# Patient Record
Sex: FEMALE | Race: WHITE | NOT HISPANIC OR LATINO | Employment: PART TIME | URBAN - METROPOLITAN AREA
[De-identification: names, ages, dates, MRNs, and addresses within clinical notes are randomized per-mention and may not be internally consistent; named-entity substitution may affect disease eponyms.]

---

## 2017-08-30 ENCOUNTER — HOSPITAL ENCOUNTER (EMERGENCY)
Facility: HOSPITAL | Age: 57
Discharge: HOME | End: 2017-08-30
Attending: EMERGENCY MEDICINE | Admitting: EMERGENCY MEDICINE

## 2017-08-30 ENCOUNTER — APPOINTMENT (OUTPATIENT)
Dept: GENERAL RADIOLOGY | Facility: HOSPITAL | Age: 57
End: 2017-08-30

## 2017-08-30 VITALS
HEIGHT: 66 IN | BODY MASS INDEX: 25.07 KG/M2 | TEMPERATURE: 98.4 F | RESPIRATION RATE: 18 BRPM | HEART RATE: 86 BPM | DIASTOLIC BLOOD PRESSURE: 80 MMHG | OXYGEN SATURATION: 96 % | SYSTOLIC BLOOD PRESSURE: 166 MMHG | WEIGHT: 156 LBS

## 2017-08-30 DIAGNOSIS — S80.01XA CONTUSION OF RIGHT KNEE, INITIAL ENCOUNTER: ICD-10-CM
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
## 2017-08-30 DIAGNOSIS — S70.01XA CONTUSION OF RIGHT HIP, INITIAL ENCOUNTER: Primary | ICD-10-CM

## 2017-08-30 PROCEDURE — 99283 EMERGENCY DEPT VISIT LOW MDM: CPT

## 2017-08-30 PROCEDURE — 73560 X-RAY EXAM OF KNEE 1 OR 2: CPT

## 2017-08-30 PROCEDURE — 73502 X-RAY EXAM HIP UNI 2-3 VIEWS: CPT

## 2017-08-30 RX ORDER — OXYCODONE HYDROCHLORIDE AND ACETAMINOPHEN 5; 325 MG/1; MG/1
2 TABLET ORAL ONCE
Status: COMPLETED | OUTPATIENT
Start: 2017-08-30 | End: 2017-08-30

## 2017-08-30 RX ORDER — OXYCODONE AND ACETAMINOPHEN 10; 325 MG/1; MG/1
1 TABLET ORAL EVERY 6 HOURS PRN
Status: ON HOLD | COMMUNITY
End: 2021-05-03 | Stop reason: SDUPTHER

## 2017-08-30 RX ORDER — ASPIRIN 81 MG/1
81 TABLET, CHEWABLE ORAL DAILY
COMMUNITY
End: 2020-02-29 | Stop reason: HOSPADM

## 2017-08-30 RX ORDER — LISINOPRIL 5 MG/1
5 TABLET ORAL DAILY
COMMUNITY
End: 2020-03-06 | Stop reason: SDUPTHER

## 2017-08-30 RX ORDER — GABAPENTIN 600 MG/1
600 TABLET ORAL 2 TIMES DAILY
COMMUNITY
End: 2021-05-03 | Stop reason: HOSPADM

## 2017-08-30 RX ORDER — BACLOFEN 20 MG/1
20 TABLET ORAL 4 TIMES DAILY
COMMUNITY
End: 2021-05-03 | Stop reason: HOSPADM

## 2017-08-30 RX ORDER — METFORMIN HYDROCHLORIDE 500 MG/1
1000 TABLET, EXTENDED RELEASE ORAL
COMMUNITY
End: 2020-08-14

## 2017-08-30 RX ORDER — PANTOPRAZOLE SODIUM 40 MG/1
40 TABLET, DELAYED RELEASE ORAL 2 TIMES DAILY
COMMUNITY

## 2017-08-30 RX ORDER — ALPRAZOLAM 0.5 MG/1
0.5 TABLET ORAL 2 TIMES DAILY PRN
Status: ON HOLD | COMMUNITY
End: 2021-05-03 | Stop reason: SDUPTHER

## 2017-08-30 RX ORDER — ISOSORBIDE MONONITRATE 30 MG/1
30 TABLET, EXTENDED RELEASE ORAL DAILY
COMMUNITY

## 2017-08-30 RX ORDER — METOPROLOL TARTRATE 50 MG/1
50 TABLET, FILM COATED ORAL DAILY
COMMUNITY
End: 2019-08-30

## 2017-08-30 RX ORDER — ATORVASTATIN CALCIUM 40 MG/1
40 TABLET, FILM COATED ORAL DAILY
COMMUNITY
End: 2019-08-30

## 2017-08-30 RX ADMIN — OXYCODONE HYDROCHLORIDE AND ACETAMINOPHEN 2 TABLET: 5; 325 TABLET ORAL at 16:25

## 2019-07-26 ENCOUNTER — TELEPHONE (OUTPATIENT)
Dept: CARDIOLOGY | Facility: CLINIC | Age: 59
End: 2019-07-26

## 2019-07-26 NOTE — TELEPHONE ENCOUNTER
----- Message from Ekaterina Rousseau sent at 7/26/2019 12:47 PM EDT -----  Regarding: FW: Pt Requesting Dr. Wren  Contact: [phone]  See below.  Please advise.  Thank you,  Ekaterina    ----- Message -----  From: Anabell Galeano RegSched Rep  Sent: 7/26/2019   9:52 AM  To: Ekaterina Rousseau  Subject: Pt Requesting Dr. Wren                         Pt would like to see Dr. Wren. She said he 'saved her life' in 2002.   Please advise.   Thank you.

## 2019-07-26 NOTE — TELEPHONE ENCOUNTER
Ekaterina it will have to be with one of the [facility] doctors  As of now with him covering cath lab. He has nothing available

## 2019-08-30 ENCOUNTER — OFFICE VISIT (OUTPATIENT)
Dept: CARDIOLOGY | Facility: CLINIC | Age: 59
End: 2019-08-30

## 2019-08-30 VITALS
SYSTOLIC BLOOD PRESSURE: 146 MMHG | WEIGHT: 152 LBS | DIASTOLIC BLOOD PRESSURE: 88 MMHG | HEART RATE: 75 BPM | HEIGHT: 66 IN | BODY MASS INDEX: 24.43 KG/M2

## 2019-08-30 DIAGNOSIS — I25.10 CORONARY ARTERY DISEASE INVOLVING NATIVE CORONARY ARTERY OF NATIVE HEART WITHOUT ANGINA PECTORIS: ICD-10-CM

## 2019-08-30 DIAGNOSIS — T14.8XXA BRUISING: Primary | ICD-10-CM

## 2019-08-30 PROCEDURE — 93000 ELECTROCARDIOGRAM COMPLETE: CPT | Performed by: INTERNAL MEDICINE

## 2019-08-30 PROCEDURE — 99204 OFFICE O/P NEW MOD 45 MIN: CPT | Performed by: INTERNAL MEDICINE

## 2019-08-30 RX ORDER — HYDROCHLOROTHIAZIDE 25 MG/1
TABLET ORAL EVERY OTHER DAY
Refills: 6 | COMMUNITY
Start: 2019-08-25 | End: 2020-02-29 | Stop reason: HOSPADM

## 2019-08-30 RX ORDER — METOPROLOL SUCCINATE 50 MG/1
TABLET, EXTENDED RELEASE ORAL 2 TIMES DAILY
COMMUNITY
Start: 2017-03-03 | End: 2020-08-14 | Stop reason: SDUPTHER

## 2019-08-30 RX ORDER — TIZANIDINE 4 MG/1
TABLET ORAL AS NEEDED
Refills: 0 | COMMUNITY
Start: 2019-08-17 | End: 2021-05-03 | Stop reason: HOSPADM

## 2019-08-30 RX ORDER — ROSUVASTATIN CALCIUM 20 MG/1
20 TABLET, COATED ORAL DAILY
Refills: 3 | COMMUNITY
Start: 2019-08-25 | End: 2020-02-17 | Stop reason: ALTCHOICE

## 2019-08-30 RX ORDER — ESTRADIOL 1 MG/1
TABLET ORAL DAILY
Refills: 11 | COMMUNITY
Start: 2019-08-18 | End: 2020-02-29 | Stop reason: HOSPADM

## 2019-08-30 RX ORDER — ESCITALOPRAM OXALATE 10 MG/1
TABLET ORAL
Refills: 10 | COMMUNITY
Start: 2019-08-25

## 2019-08-30 NOTE — PROGRESS NOTES
Subjective:     Encounter Date:08/30/2019      Patient ID: Grace Mcclain is a 58 y.o. female.    Chief Complaint:  This is a 58-year-old woman who suffered acute anterior MI in 2002.  She was seen and treated by Dr. Wren at [facility] and received a LAD stent at that time.   In 2008 she underwent heart catheterization for recurrent angina.  She had mild coronary disease with mild in-stent restenosis, however nothing significant was found.  She was started on Imdur at that time.  She does not really have angina anymore.  She had a PE somewhere around 2011.  She was treated for 6 months, and has not been on any anticoagulation since then.  She is quite active.  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  She walks for exercise.  She never has exertional chest pain or dyspnea.  Unfortunately continues she continues to smoke about three quarters of a pack per day.  She has quit in the past but smokes with stress.        The following portions of the patient's history were reviewed and updated as appropriate: allergies, current medications, past family history, past medical history, past social history, past surgical history and problem list.         REVIEW OF SYSTEMS:   All systems reviewed.  Pertinent positives identified in HPI.  All other systems are negative.      Past Medical History:   Diagnosis Date   • Diabetes mellitus (CMS/HCC)    • Myocardial infarction (CMS/HCC)    • PE (pulmonary thromboembolism) (CMS/[facility])        No family history on file.    Social History     Socioeconomic History   • Marital status:      Spouse name: Not on file   • Number of children: Not on file   • Years of education: Not on file   • Highest education level: Not on file   Tobacco Use   • Smoking status: Current Every Day Smoker     Packs/day: 0.75   • Smokeless tobacco: Never Used   • Tobacco comment: daily caffeine use   Substance and Sexual Activity   • Alcohol use: No   • Drug use: No       No Known  Allergies    Past Surgical History:   Procedure Laterality Date   • BACK SURGERY     • CARDIAC SURGERY     • EYE SURGERY     • HYSTERECTOMY     • SHOULDER SURGERY Left          ECG 12 Lead  Date/Time: 8/30/2019 4:08 PM  Performed by: Ana Mack MD  Authorized by: Ana Mack MD   Comparison: compared with previous ECG from 10/27/2011  Similar to previous ECG  Rhythm: sinus rhythm  Rate: normal  Q waves: V1 and V2    ST Elevation: V1, V2 and V3  T inversion: V2, V3, aVL and I  QRS axis: normal    Clinical impression: abnormal EKG  Comments: Consistent with prior anterior MI               Objective:         GEN: VSS, no distress,   Eyes: normal sclera, normal lids and lashes  HENT: moist mucus membranes,   Respiratory: CTAB, no rales or wheezes  CV: RRR, no murmurs, , +2 DP and 2+ carotid pulses b/l  GI: NABS, soft,  Nontender, nondistended  MSK: no edema, no scoliosis or kyphosis  Skin: no rash, warm, dry  Heme/Lymph: Purpura and bruising on arms.  Psych: organized thought, normal behavior and affect  Neuro: Cranial nerves grossly intact, Alert and Oriented x 3.     Outpatient Encounter Medications as of 8/30/2019   Medication Sig Dispense Refill   • ALPRAZolam (XANAX) 0.5 MG tablet Take 0.5 mg by mouth 2 (Two) Times a Day As Needed for Anxiety.     • aspirin 81 MG chewable tablet Chew 81 mg Daily.     • baclofen (LIORESAL) 20 MG tablet Take 20 mg by mouth 3 (Three) Times a Day.     • Cholecalciferol (VITAMIN D3) 5000 units capsule capsule Take 5,000 Units by mouth Daily.     • escitalopram (LEXAPRO) 10 MG tablet Take  by mouth every night at bedtime.  10   • estradiol (ESTRACE) 1 MG tablet Take  by mouth Daily.  11   • gabapentin (NEURONTIN) 600 MG tablet Take 600 mg by mouth 2 (Two) Times a Day.     • hydrochlorothiazide (HYDRODIURIL) 25 MG tablet Take  by mouth Every Other Day.  6   • isosorbide mononitrate (IMDUR) 30 MG 24 hr tablet Take 30 mg by mouth Daily.     • lisinopril (PRINIVIL,ZESTRIL) 5 MG tablet  Take 5 mg by mouth Daily.     • metFORMIN ER (GLUCOPHAGE-XR) 500 MG 24 hr tablet Take 1,000 mg by mouth Daily With Breakfast.     • metoprolol succinate XL (TOPROL-XL) 50 MG 24 hr tablet Take  by mouth Daily.     • oxyCODONE-acetaminophen (PERCOCET)  MG per tablet Take 1 tablet by mouth Every 6 (Six) Hours As Needed for Moderate Pain .     • pantoprazole (PROTONIX) 40 MG EC tablet Take 40 mg by mouth 2 (Two) Times a Day.     • rosuvastatin (CRESTOR) 20 MG tablet Take 20 mg by mouth Daily.  3   • tiZANidine (ZANAFLEX) 4 MG tablet Take  by mouth As Needed.  0   • [DISCONTINUED] atorvastatin (LIPITOR) 40 MG tablet Take 40 mg by mouth Daily.     • [DISCONTINUED] metoprolol tartrate (LOPRESSOR) 50 MG tablet Take 50 mg by mouth Daily.       No facility-administered encounter medications on file as of 8/30/2019.              Assessment:          Diagnosis Plan   1. Bruising  CBC & Differential   2. Coronary artery disease involving native coronary artery of native heart without angina pectoris            Plan:       Coronary Artery Disease  Assessment  • The patient has no angina  • This is a 58-year-old woman who suffered an MI in 2002.  She had a repeat catheterization in 2008 which showed mild coronary disease and a patent LAD stent.  She continues to do well.  I would continue aspirin, Crestor, metoprolol 50 daily, Imdur 30 daily.    Subjective - Objective  • There is a history of past MI  • There has been a previous stent procedure using VIC  • Current antiplatelet therapy includes aspirin 81 mg    Hypertension: Continue current medications.  Bruising/purpura: Unlikely to be related to aspirin therapy alone.  I will get a CBC.  I will refer her to hematology.  Smoking: We discussed smoking sensation for over 10 minutes.  She is ready to quit.  Diabetes: On metformin.  On appropriate statin therapy.  Menieres disease: I am okay with increasing HCTZ 25 daily as proposed by her PCP    Dr. Mccain, thank you very  much for referring this kind patient to me.  Please call with any questions or concerns.  She will follow-up with me in 6 months.         Ana Mack MD  08/30/19  [facility]

## 2019-09-11 ENCOUNTER — TELEPHONE (OUTPATIENT)
Dept: CARDIOLOGY | Facility: CLINIC | Age: 59
End: 2019-09-11

## 2019-09-16 ENCOUNTER — LAB (OUTPATIENT)
Dept: LAB | Facility: HOSPITAL | Age: 59
End: 2019-09-16

## 2019-09-16 DIAGNOSIS — T14.8XXA BRUISING: ICD-10-CM

## 2019-09-16 LAB
BASOPHILS # BLD AUTO: 0.03 10*3/MM3 (ref 0–0.2)
BASOPHILS NFR BLD AUTO: 0.3 % (ref 0–1.5)
DEPRECATED RDW RBC AUTO: 47.8 FL (ref 37–54)
EOSINOPHIL # BLD AUTO: 0.1 10*3/MM3 (ref 0–0.4)
EOSINOPHIL NFR BLD AUTO: 1 % (ref 0.3–6.2)
ERYTHROCYTE [DISTWIDTH] IN BLOOD BY AUTOMATED COUNT: 14.5 % (ref 12.3–15.4)
HCT VFR BLD AUTO: 40.3 % (ref 34–46.6)
HGB BLD-MCNC: 12.5 G/DL (ref 12–15.9)
IMM GRANULOCYTES # BLD AUTO: 0.03 10*3/MM3 (ref 0–0.05)
IMM GRANULOCYTES NFR BLD AUTO: 0.3 % (ref 0–0.5)
LYMPHOCYTES # BLD AUTO: 2.45 10*3/MM3 (ref 0.7–3.1)
LYMPHOCYTES NFR BLD AUTO: 23.9 % (ref 19.6–45.3)
MCH RBC QN AUTO: 28.2 PG (ref 26.6–33)
MCHC RBC AUTO-ENTMCNC: 31 G/DL (ref 31.5–35.7)
MCV RBC AUTO: 90.8 FL (ref 79–97)
MONOCYTES # BLD AUTO: 0.69 10*3/MM3 (ref 0.1–0.9)
MONOCYTES NFR BLD AUTO: 6.7 % (ref 5–12)
NEUTROPHILS # BLD AUTO: 6.95 10*3/MM3 (ref 1.7–7)
NEUTROPHILS NFR BLD AUTO: 67.8 % (ref 42.7–76)
NRBC BLD AUTO-RTO: 0 /100 WBC (ref 0–0.2)
PLATELET # BLD AUTO: 200 10*3/MM3 (ref 140–450)
PMV BLD AUTO: 10.4 FL (ref 6–12)
RBC # BLD AUTO: 4.44 10*6/MM3 (ref 3.77–5.28)
WBC NRBC COR # BLD: 10.25 10*3/MM3 (ref 3.4–10.8)

## 2019-09-16 PROCEDURE — 36415 COLL VENOUS BLD VENIPUNCTURE: CPT

## 2019-09-16 PROCEDURE — 85025 COMPLETE CBC W/AUTO DIFF WBC: CPT

## 2019-10-01 ENCOUNTER — TELEPHONE (OUTPATIENT)
Dept: CARDIOLOGY | Facility: CLINIC | Age: 59
End: 2019-10-01

## 2020-02-17 ENCOUNTER — OFFICE VISIT (OUTPATIENT)
Dept: CARDIOLOGY | Facility: CLINIC | Age: 60
End: 2020-02-17

## 2020-02-17 VITALS
SYSTOLIC BLOOD PRESSURE: 124 MMHG | HEART RATE: 77 BPM | HEIGHT: 66 IN | BODY MASS INDEX: 25.71 KG/M2 | WEIGHT: 160 LBS | DIASTOLIC BLOOD PRESSURE: 66 MMHG

## 2020-02-17 DIAGNOSIS — I25.10 CORONARY ARTERY DISEASE INVOLVING NATIVE CORONARY ARTERY OF NATIVE HEART WITHOUT ANGINA PECTORIS: Primary | ICD-10-CM

## 2020-02-17 PROCEDURE — 93000 ELECTROCARDIOGRAM COMPLETE: CPT | Performed by: INTERNAL MEDICINE

## 2020-02-17 PROCEDURE — 99214 OFFICE O/P EST MOD 30 MIN: CPT | Performed by: INTERNAL MEDICINE

## 2020-02-17 RX ORDER — ATORVASTATIN CALCIUM 40 MG/1
40 TABLET, FILM COATED ORAL DAILY
Status: ON HOLD | COMMUNITY
End: 2020-02-28

## 2020-02-17 NOTE — PROGRESS NOTES
Subjective:     Encounter Date: 02/17/20      Patient ID: Grace Mcclain is a 59 y.o. female.    Chief Complaint:  History of Present Illness     This is a 59-year-old woman who suffered acute anterior MI in 2002.  She was seen and treated by Dr. Wren at [facility] and received a LAD stent at that time.   In 2008 she underwent heart catheterization for recurrent angina.  She had mild coronary disease with mild in-stent restenosis, however nothing significant was found.  She was started on Imdur at that time.    Today she complains of new chest discomfort.  She has burning in the central chest associated with dyspnea, diaphoresis and lightheadedness after walking 10 to 15 minutes or multiple flights of stairs.  It resolves after a few minutes of rest.  It is fairly stable in its frequency and severity.  This is new over the last 3 to 4 months.  She has no palpitations, syncope, orthopnea or PND.  She continues to smoke half a pack to three quarters of pack of cigarettes per day.  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  The following portions of the patient's history were reviewed and updated as appropriate: allergies, current medications, past family history, past medical history, past social history, past surgical history and problem list.         REVIEW OF SYSTEMS:   All systems reviewed.  Pertinent positives identified in HPI.  All other systems are negative.      Past Medical History:   Diagnosis Date   • Diabetes mellitus (CMS/HCC)    • Myocardial infarction (CMS/HCC)    • PE (pulmonary thromboembolism) (CMS/HCC)        No family history on file.    Social History     Socioeconomic History   • Marital status:      Spouse name: Not on file   • Number of children: Not on file   • Years of education: Not on file   • Highest education level: Not on file   Tobacco Use   • Smoking status: Current Every Day Smoker     Packs/day: 0.75   • Smokeless tobacco: Never Used   • Tobacco  comment: daily caffeine use   Substance and Sexual Activity   • Alcohol use: No   • Drug use: No       No Known Allergies    Past Surgical History:   Procedure Laterality Date   • BACK SURGERY     • CARDIAC SURGERY     • EYE SURGERY     • HYSTERECTOMY     • SHOULDER SURGERY Left          ECG 12 Lead  Date/Time: 2/17/2020 4:09 PM  Performed by: Ana Mack MD  Authorized by: Ana Mack MD   Comparison: compared with previous ECG from 8/30/2019  Rhythm: sinus rhythm  Rate: normal  Q waves: V1, V2 and V3    ST Segments: ST segments normal  T Waves: T waves normal  QRS axis: normal  Other findings: left atrial abnormality    Clinical impression: abnormal EKG             Objective:         GEN: VSS, no distress,   Eyes: normal sclera, normal lids and lashes  HENT: moist mucus membranes,   Respiratory: CTAB, no rales or wheezes  CV: RRR, no murmurs, , +2 DP and 2+ carotid pulses b/l  GI: NABS, soft,  Nontender, nondistended  MSK: no edema, no scoliosis or kyphosis  Skin: no rash, warm, dry  Heme/Lymph: Purpura and bruising on arms.  Psych: organized thought, normal behavior and affect  Neuro: Cranial nerves grossly intact, Alert and Oriented x 3.     Outpatient Encounter Medications as of 2/17/2020   Medication Sig Dispense Refill   • ALPRAZolam (XANAX) 0.5 MG tablet Take 0.5 mg by mouth 2 (Two) Times a Day As Needed for Anxiety.     • aspirin 81 MG chewable tablet Chew 81 mg Daily.     • atorvastatin (LIPITOR) 40 MG tablet Take 40 mg by mouth Daily.     • baclofen (LIORESAL) 20 MG tablet Take 20 mg by mouth 3 (Three) Times a Day.     • Cholecalciferol (VITAMIN D3) 5000 units capsule capsule Take 5,000 Units by mouth Daily.     • escitalopram (LEXAPRO) 10 MG tablet Take  by mouth every night at bedtime.  10   • estradiol (ESTRACE) 1 MG tablet Take  by mouth Daily.  11   • gabapentin (NEURONTIN) 600 MG tablet Take 600 mg by mouth 2 (Two) Times a Day.     • hydrochlorothiazide (HYDRODIURIL) 25 MG tablet Take  by  mouth Every Other Day.  6   • isosorbide mononitrate (IMDUR) 30 MG 24 hr tablet Take 30 mg by mouth Daily.     • lisinopril (PRINIVIL,ZESTRIL) 5 MG tablet Take 5 mg by mouth Daily.     • metFORMIN ER (GLUCOPHAGE-XR) 500 MG 24 hr tablet Take 1,000 mg by mouth Daily With Breakfast.     • metoprolol succinate XL (TOPROL-XL) 50 MG 24 hr tablet Take  by mouth Daily.     • oxyCODONE-acetaminophen (PERCOCET)  MG per tablet Take 1 tablet by mouth Every 6 (Six) Hours As Needed for Moderate Pain .     • pantoprazole (PROTONIX) 40 MG EC tablet Take 40 mg by mouth 2 (Two) Times a Day.     • tiZANidine (ZANAFLEX) 4 MG tablet Take  by mouth As Needed.  0   • [DISCONTINUED] rosuvastatin (CRESTOR) 20 MG tablet Take 20 mg by mouth Daily.  3     No facility-administered encounter medications on file as of 2/17/2020.              Assessment:          Diagnosis Plan   1. Coronary artery disease involving native coronary artery of native heart without angina pectoris            Plan:       Coronary artery disease: LAD stenting in 2002 in the setting of a MI.  She has new onset chest pain over the last 3 to 4 months which she experiences as a burning substernal chest pain with exertion.  For this reason, I think that she should undergo nuclear stress test.  Antianginal therapy includes Toprol 50 and Imdur 30 mg daily.  Continue aspirin and atorvastatin 40    Hypertension: Continue current medications.    Smoking: We discussed smoking sensation for over 10 minutes.  She is ready to quit.    Diabetes: On metformin.  On appropriate statin therapy.    Menieres disease    Dr. Mccain, thank you very much for referring this kind patient to me.  Please call with any questions or concerns.  She will follow-up with me in 6 months.         Ana Mack MD  02/17/20  [facility]

## 2020-02-25 ENCOUNTER — HOSPITAL ENCOUNTER (OUTPATIENT)
Dept: CARDIOLOGY | Facility: HOSPITAL | Age: 60
Discharge: HOME | End: 2020-02-25
Admitting: INTERNAL MEDICINE

## 2020-02-25 VITALS — WEIGHT: 160.05 LBS | BODY MASS INDEX: 25.72 KG/M2 | HEIGHT: 66 IN

## 2020-02-25 DIAGNOSIS — I25.10 CORONARY ARTERY DISEASE INVOLVING NATIVE CORONARY ARTERY OF NATIVE HEART WITHOUT ANGINA PECTORIS: ICD-10-CM

## 2020-02-25 LAB
BH CV NUCLEAR PRIOR STUDY: 2
BH CV STRESS BP STAGE 1: NORMAL
BH CV STRESS COMMENTS STAGE 1: NORMAL
BH CV STRESS DOSE REGADENOSON STAGE 1: 0.4
BH CV STRESS DURATION MIN STAGE 1: 0
BH CV STRESS DURATION SEC STAGE 1: 10
BH CV STRESS HR STAGE 1: 100
BH CV STRESS PROTOCOL 1: NORMAL
BH CV STRESS RECOVERY BP: NORMAL MMHG
BH CV STRESS RECOVERY HR: 89 BPM
BH CV STRESS STAGE 1: 1
LV EF NUC BP: 35 %
MAXIMAL PREDICTED HEART RATE: 163 BPM
PERCENT MAX PREDICTED HR: 61.35 %
STRESS BASELINE BP: NORMAL MMHG
STRESS BASELINE HR: 63 BPM
STRESS PERCENT HR: 72 %
STRESS POST EXERCISE DUR SEC: 10 SEC
STRESS POST PEAK BP: NORMAL MMHG
STRESS POST PEAK HR: 100 BPM
STRESS TARGET HR: 139 BPM

## 2020-02-25 PROCEDURE — 78492 MYOCRD IMG PET MLT RST&STRS: CPT

## 2020-02-25 PROCEDURE — 0 RUBIDIUM CHLORIDE: Performed by: INTERNAL MEDICINE

## 2020-02-25 PROCEDURE — 25010000002 REGADENOSON 0.4 MG/5ML SOLUTION: Performed by: INTERNAL MEDICINE

## 2020-02-25 PROCEDURE — A9555 RB82 RUBIDIUM: HCPCS | Performed by: INTERNAL MEDICINE

## 2020-02-25 PROCEDURE — 93018 CV STRESS TEST I&R ONLY: CPT | Performed by: INTERNAL MEDICINE

## 2020-02-25 PROCEDURE — 93016 CV STRESS TEST SUPVJ ONLY: CPT | Performed by: INTERNAL MEDICINE

## 2020-02-25 PROCEDURE — 78492 MYOCRD IMG PET MLT RST&STRS: CPT | Performed by: INTERNAL MEDICINE

## 2020-02-25 PROCEDURE — 93017 CV STRESS TEST TRACING ONLY: CPT

## 2020-02-25 RX ADMIN — REGADENOSON 0.4 MG: 0.08 INJECTION, SOLUTION INTRAVENOUS at 08:45

## 2020-02-25 RX ADMIN — RUBIDIUM CHLORIDE RB-82 1 DOSE: 150 INJECTION, SOLUTION INTRAVENOUS at 08:45

## 2020-02-25 RX ADMIN — RUBIDIUM CHLORIDE RB-82 1 DOSE: 150 INJECTION, SOLUTION INTRAVENOUS at 08:35

## 2020-02-26 DIAGNOSIS — I25.119 CORONARY ARTERY DISEASE INVOLVING NATIVE CORONARY ARTERY OF NATIVE HEART WITH ANGINA PECTORIS (HCC): Primary | ICD-10-CM

## 2020-02-27 ENCOUNTER — TRANSCRIBE ORDERS (OUTPATIENT)
Dept: LAB | Facility: HOSPITAL | Age: 60
End: 2020-02-27

## 2020-02-27 ENCOUNTER — LAB (OUTPATIENT)
Dept: LAB | Facility: HOSPITAL | Age: 60
End: 2020-02-27

## 2020-02-27 DIAGNOSIS — Z01.810 PRE-OPERATIVE CARDIOVASCULAR EXAMINATION: ICD-10-CM

## 2020-02-27 DIAGNOSIS — Z01.810 PRE-OPERATIVE CARDIOVASCULAR EXAMINATION: Primary | ICD-10-CM

## 2020-02-27 DIAGNOSIS — Z13.6 SCREENING FOR ISCHEMIC HEART DISEASE: ICD-10-CM

## 2020-02-27 PROBLEM — I25.119 CORONARY ARTERY DISEASE INVOLVING NATIVE CORONARY ARTERY OF NATIVE HEART WITH ANGINA PECTORIS (HCC): Status: ACTIVE | Noted: 2020-02-27

## 2020-02-27 LAB
ANION GAP SERPL CALCULATED.3IONS-SCNC: 9.5 MMOL/L (ref 5–15)
BASOPHILS # BLD AUTO: 0.03 10*3/MM3 (ref 0–0.2)
BASOPHILS NFR BLD AUTO: 0.3 % (ref 0–1.5)
BUN BLD-MCNC: 12 MG/DL (ref 6–20)
BUN/CREAT SERPL: 16.2 (ref 7–25)
CALCIUM SPEC-SCNC: 8.6 MG/DL (ref 8.6–10.5)
CHLORIDE SERPL-SCNC: 103 MMOL/L (ref 98–107)
CO2 SERPL-SCNC: 24.5 MMOL/L (ref 22–29)
CREAT BLD-MCNC: 0.74 MG/DL (ref 0.57–1)
DEPRECATED RDW RBC AUTO: 47.7 FL (ref 37–54)
EOSINOPHIL # BLD AUTO: 0.05 10*3/MM3 (ref 0–0.4)
EOSINOPHIL NFR BLD AUTO: 0.5 % (ref 0.3–6.2)
ERYTHROCYTE [DISTWIDTH] IN BLOOD BY AUTOMATED COUNT: 17.4 % (ref 12.3–15.4)
GFR SERPL CREATININE-BSD FRML MDRD: 81 ML/MIN/1.73
GLUCOSE BLD-MCNC: 158 MG/DL (ref 65–99)
HCT VFR BLD AUTO: 37.1 % (ref 34–46.6)
HGB BLD-MCNC: 11.5 G/DL (ref 12–15.9)
IMM GRANULOCYTES # BLD AUTO: 0.02 10*3/MM3 (ref 0–0.05)
IMM GRANULOCYTES NFR BLD AUTO: 0.2 % (ref 0–0.5)
LYMPHOCYTES # BLD AUTO: 1.91 10*3/MM3 (ref 0.7–3.1)
LYMPHOCYTES NFR BLD AUTO: 20.1 % (ref 19.6–45.3)
MCH RBC QN AUTO: 24 PG (ref 26.6–33)
MCHC RBC AUTO-ENTMCNC: 31 G/DL (ref 31.5–35.7)
MCV RBC AUTO: 77.5 FL (ref 79–97)
MONOCYTES # BLD AUTO: 0.7 10*3/MM3 (ref 0.1–0.9)
MONOCYTES NFR BLD AUTO: 7.4 % (ref 5–12)
NEUTROPHILS # BLD AUTO: 6.77 10*3/MM3 (ref 1.7–7)
NEUTROPHILS NFR BLD AUTO: 71.5 % (ref 42.7–76)
NRBC BLD AUTO-RTO: 0 /100 WBC (ref 0–0.2)
PLATELET # BLD AUTO: 180 10*3/MM3 (ref 140–450)
PMV BLD AUTO: 10.1 FL (ref 6–12)
POTASSIUM BLD-SCNC: 4.5 MMOL/L (ref 3.5–5.2)
RBC # BLD AUTO: 4.79 10*6/MM3 (ref 3.77–5.28)
SODIUM BLD-SCNC: 137 MMOL/L (ref 136–145)
WBC NRBC COR # BLD: 9.48 10*3/MM3 (ref 3.4–10.8)

## 2020-02-27 PROCEDURE — 80048 BASIC METABOLIC PNL TOTAL CA: CPT

## 2020-02-27 PROCEDURE — 36415 COLL VENOUS BLD VENIPUNCTURE: CPT

## 2020-02-27 PROCEDURE — 85025 COMPLETE CBC W/AUTO DIFF WBC: CPT

## 2020-02-28 ENCOUNTER — HOSPITAL ENCOUNTER (OUTPATIENT)
Facility: HOSPITAL | Age: 60
Discharge: HOME | End: 2020-02-29
Attending: INTERNAL MEDICINE | Admitting: INTERNAL MEDICINE

## 2020-02-28 DIAGNOSIS — I25.119 CORONARY ARTERY DISEASE INVOLVING NATIVE CORONARY ARTERY OF NATIVE HEART WITH ANGINA PECTORIS (HCC): ICD-10-CM

## 2020-02-28 LAB
ACT BLD: 290 SECONDS (ref 82–152)
ACT BLD: 318 SECONDS (ref 82–152)
GLUCOSE BLDC GLUCOMTR-MCNC: 93 MG/DL (ref 70–130)

## 2020-02-28 PROCEDURE — C9600 PERC DRUG-EL COR STENT SING: HCPCS | Performed by: INTERNAL MEDICINE

## 2020-02-28 PROCEDURE — 93010 ELECTROCARDIOGRAM REPORT: CPT | Performed by: INTERNAL MEDICINE

## 2020-02-28 PROCEDURE — C1725 CATH, TRANSLUMIN NON-LASER: HCPCS | Performed by: INTERNAL MEDICINE

## 2020-02-28 PROCEDURE — 93458 L HRT ARTERY/VENTRICLE ANGIO: CPT | Performed by: INTERNAL MEDICINE

## 2020-02-28 PROCEDURE — A9270 NON-COVERED ITEM OR SERVICE: HCPCS | Performed by: INTERNAL MEDICINE

## 2020-02-28 PROCEDURE — 63710000001 ISOSORBIDE MONONITRATE 30 MG TABLET SUSTAINED-RELEASE 24 HOUR: Performed by: INTERNAL MEDICINE

## 2020-02-28 PROCEDURE — 25010000002 FENTANYL CITRATE (PF) 100 MCG/2ML SOLUTION: Performed by: INTERNAL MEDICINE

## 2020-02-28 PROCEDURE — 92928 PRQ TCAT PLMT NTRAC ST 1 LES: CPT | Performed by: INTERNAL MEDICINE

## 2020-02-28 PROCEDURE — C1874 STENT, COATED/COV W/DEL SYS: HCPCS | Performed by: INTERNAL MEDICINE

## 2020-02-28 PROCEDURE — 63710000001 GABAPENTIN 300 MG CAPSULE: Performed by: INTERNAL MEDICINE

## 2020-02-28 PROCEDURE — 25010000002 HYDRALAZINE PER 20 MG: Performed by: INTERNAL MEDICINE

## 2020-02-28 PROCEDURE — 63710000001 ESCITALOPRAM 10 MG TABLET: Performed by: INTERNAL MEDICINE

## 2020-02-28 PROCEDURE — 63710000001 ROSUVASTATIN 20 MG TABLET: Performed by: INTERNAL MEDICINE

## 2020-02-28 PROCEDURE — 63710000001 LISINOPRIL 5 MG TABLET: Performed by: INTERNAL MEDICINE

## 2020-02-28 PROCEDURE — 93005 ELECTROCARDIOGRAM TRACING: CPT | Performed by: INTERNAL MEDICINE

## 2020-02-28 PROCEDURE — C1894 INTRO/SHEATH, NON-LASER: HCPCS | Performed by: INTERNAL MEDICINE

## 2020-02-28 PROCEDURE — 63710000001 ALPRAZOLAM 0.5 MG TABLET: Performed by: INTERNAL MEDICINE

## 2020-02-28 PROCEDURE — 63710000001 OXYCODONE-ACETAMINOPHEN 10-325 MG TABLET: Performed by: INTERNAL MEDICINE

## 2020-02-28 PROCEDURE — 85347 COAGULATION TIME ACTIVATED: CPT

## 2020-02-28 PROCEDURE — C1769 GUIDE WIRE: HCPCS | Performed by: INTERNAL MEDICINE

## 2020-02-28 PROCEDURE — 25010000002 MIDAZOLAM PER 1 MG: Performed by: INTERNAL MEDICINE

## 2020-02-28 PROCEDURE — G0378 HOSPITAL OBSERVATION PER HR: HCPCS

## 2020-02-28 PROCEDURE — 0 IOPAMIDOL PER 1 ML: Performed by: INTERNAL MEDICINE

## 2020-02-28 PROCEDURE — 99152 MOD SED SAME PHYS/QHP 5/>YRS: CPT | Performed by: INTERNAL MEDICINE

## 2020-02-28 PROCEDURE — 63710000001 METOPROLOL SUCCINATE XL 50 MG TABLET SUSTAINED-RELEASE 24 HOUR: Performed by: INTERNAL MEDICINE

## 2020-02-28 PROCEDURE — 63710000001 BACLOFEN 10 MG TABLET: Performed by: INTERNAL MEDICINE

## 2020-02-28 PROCEDURE — 99153 MOD SED SAME PHYS/QHP EA: CPT | Performed by: INTERNAL MEDICINE

## 2020-02-28 PROCEDURE — C1887 CATHETER, GUIDING: HCPCS | Performed by: INTERNAL MEDICINE

## 2020-02-28 PROCEDURE — 25010000002 HEPARIN (PORCINE) PER 1000 UNITS: Performed by: INTERNAL MEDICINE

## 2020-02-28 PROCEDURE — 82962 GLUCOSE BLOOD TEST: CPT

## 2020-02-28 DEVICE — XIENCE SIERRA™ EVEROLIMUS ELUTING CORONARY STENT SYSTEM 3.50 MM X 15 MM / RAPID-EXCHANGE
Type: IMPLANTABLE DEVICE | Status: FUNCTIONAL
Brand: XIENCE SIERRA™

## 2020-02-28 DEVICE — XIENCE SIERRA™ EVEROLIMUS ELUTING CORONARY STENT SYSTEM 4.00 MM X 38 MM / RAPID-EXCHANGE
Type: IMPLANTABLE DEVICE | Status: FUNCTIONAL
Brand: XIENCE SIERRA™

## 2020-02-28 DEVICE — XIENCE SIERRA™ EVEROLIMUS ELUTING CORONARY STENT SYSTEM 3.50 MM X 38 MM / RAPID-EXCHANGE
Type: IMPLANTABLE DEVICE | Status: FUNCTIONAL
Brand: XIENCE SIERRA™

## 2020-02-28 RX ORDER — BACLOFEN 10 MG/1
20 TABLET ORAL 3 TIMES DAILY
Status: DISCONTINUED | OUTPATIENT
Start: 2020-02-28 | End: 2020-02-29 | Stop reason: HOSPADM

## 2020-02-28 RX ORDER — SODIUM CHLORIDE 9 MG/ML
75 INJECTION, SOLUTION INTRAVENOUS CONTINUOUS
Status: DISCONTINUED | OUTPATIENT
Start: 2020-02-28 | End: 2020-02-29 | Stop reason: HOSPADM

## 2020-02-28 RX ORDER — MORPHINE SULFATE 2 MG/ML
1 INJECTION, SOLUTION INTRAMUSCULAR; INTRAVENOUS EVERY 4 HOURS PRN
Status: DISCONTINUED | OUTPATIENT
Start: 2020-02-28 | End: 2020-02-29 | Stop reason: HOSPADM

## 2020-02-28 RX ORDER — ROSUVASTATIN CALCIUM 20 MG/1
20 TABLET, COATED ORAL DAILY
Status: DISCONTINUED | OUTPATIENT
Start: 2020-02-28 | End: 2020-02-29 | Stop reason: HOSPADM

## 2020-02-28 RX ORDER — TIZANIDINE 4 MG/1
4 TABLET ORAL EVERY 8 HOURS PRN
Status: DISCONTINUED | OUTPATIENT
Start: 2020-02-28 | End: 2020-02-29 | Stop reason: HOSPADM

## 2020-02-28 RX ORDER — ISOSORBIDE MONONITRATE 30 MG/1
30 TABLET, EXTENDED RELEASE ORAL DAILY
Status: DISCONTINUED | OUTPATIENT
Start: 2020-02-28 | End: 2020-02-29 | Stop reason: HOSPADM

## 2020-02-28 RX ORDER — HYDROCHLOROTHIAZIDE 25 MG/1
25 TABLET ORAL EVERY OTHER DAY
Status: DISCONTINUED | OUTPATIENT
Start: 2020-02-28 | End: 2020-02-29 | Stop reason: HOSPADM

## 2020-02-28 RX ORDER — SODIUM CHLORIDE 9 MG/ML
INJECTION, SOLUTION INTRAVENOUS CONTINUOUS PRN
Status: COMPLETED | OUTPATIENT
Start: 2020-02-28 | End: 2020-02-28

## 2020-02-28 RX ORDER — OXYCODONE AND ACETAMINOPHEN 10; 325 MG/1; MG/1
1 TABLET ORAL EVERY 6 HOURS PRN
Status: DISCONTINUED | OUTPATIENT
Start: 2020-02-28 | End: 2020-02-29 | Stop reason: HOSPADM

## 2020-02-28 RX ORDER — ONDANSETRON 4 MG/1
4 TABLET, FILM COATED ORAL EVERY 6 HOURS PRN
Status: DISCONTINUED | OUTPATIENT
Start: 2020-02-28 | End: 2020-02-29 | Stop reason: HOSPADM

## 2020-02-28 RX ORDER — PRASUGREL 10 MG/1
TABLET, FILM COATED ORAL AS NEEDED
Status: DISCONTINUED | OUTPATIENT
Start: 2020-02-28 | End: 2020-02-28 | Stop reason: HOSPADM

## 2020-02-28 RX ORDER — SODIUM CHLORIDE 0.9 % (FLUSH) 0.9 %
10 SYRINGE (ML) INJECTION AS NEEDED
Status: DISCONTINUED | OUTPATIENT
Start: 2020-02-28 | End: 2020-02-28 | Stop reason: HOSPADM

## 2020-02-28 RX ORDER — SENNA AND DOCUSATE SODIUM 50; 8.6 MG/1; MG/1
2 TABLET, FILM COATED ORAL 2 TIMES DAILY
Status: DISCONTINUED | OUTPATIENT
Start: 2020-02-28 | End: 2020-02-29 | Stop reason: HOSPADM

## 2020-02-28 RX ORDER — FENTANYL CITRATE 50 UG/ML
INJECTION, SOLUTION INTRAMUSCULAR; INTRAVENOUS AS NEEDED
Status: DISCONTINUED | OUTPATIENT
Start: 2020-02-28 | End: 2020-02-28 | Stop reason: HOSPADM

## 2020-02-28 RX ORDER — ACETAMINOPHEN 325 MG/1
650 TABLET ORAL EVERY 4 HOURS PRN
Status: DISCONTINUED | OUTPATIENT
Start: 2020-02-28 | End: 2020-02-29 | Stop reason: HOSPADM

## 2020-02-28 RX ORDER — ASPIRIN 81 MG/1
81 TABLET, CHEWABLE ORAL DAILY
Status: DISCONTINUED | OUTPATIENT
Start: 2020-02-28 | End: 2020-02-29 | Stop reason: HOSPADM

## 2020-02-28 RX ORDER — LIDOCAINE HYDROCHLORIDE 20 MG/ML
INJECTION, SOLUTION INFILTRATION; PERINEURAL AS NEEDED
Status: DISCONTINUED | OUTPATIENT
Start: 2020-02-28 | End: 2020-02-28 | Stop reason: HOSPADM

## 2020-02-28 RX ORDER — PRASUGREL 10 MG/1
10 TABLET, FILM COATED ORAL DAILY
Status: DISCONTINUED | OUTPATIENT
Start: 2020-02-29 | End: 2020-02-29 | Stop reason: HOSPADM

## 2020-02-28 RX ORDER — BISACODYL 5 MG/1
5 TABLET, DELAYED RELEASE ORAL DAILY PRN
Status: DISCONTINUED | OUTPATIENT
Start: 2020-02-28 | End: 2020-02-29 | Stop reason: HOSPADM

## 2020-02-28 RX ORDER — LIDOCAINE HYDROCHLORIDE 10 MG/ML
0.1 INJECTION, SOLUTION EPIDURAL; INFILTRATION; INTRACAUDAL; PERINEURAL ONCE AS NEEDED
Status: DISCONTINUED | OUTPATIENT
Start: 2020-02-28 | End: 2020-02-28 | Stop reason: HOSPADM

## 2020-02-28 RX ORDER — SODIUM CHLORIDE 0.9 % (FLUSH) 0.9 %
3 SYRINGE (ML) INJECTION EVERY 12 HOURS SCHEDULED
Status: DISCONTINUED | OUTPATIENT
Start: 2020-02-28 | End: 2020-02-28 | Stop reason: HOSPADM

## 2020-02-28 RX ORDER — ROSUVASTATIN CALCIUM 20 MG/1
20 TABLET, COATED ORAL DAILY
COMMUNITY

## 2020-02-28 RX ORDER — TEMAZEPAM 15 MG/1
15 CAPSULE ORAL NIGHTLY PRN
Status: DISCONTINUED | OUTPATIENT
Start: 2020-02-28 | End: 2020-02-29 | Stop reason: HOSPADM

## 2020-02-28 RX ORDER — MIDAZOLAM HYDROCHLORIDE 1 MG/ML
INJECTION INTRAMUSCULAR; INTRAVENOUS AS NEEDED
Status: DISCONTINUED | OUTPATIENT
Start: 2020-02-28 | End: 2020-02-28 | Stop reason: HOSPADM

## 2020-02-28 RX ORDER — ESCITALOPRAM OXALATE 10 MG/1
10 TABLET ORAL NIGHTLY
Status: DISCONTINUED | OUTPATIENT
Start: 2020-02-28 | End: 2020-02-29 | Stop reason: HOSPADM

## 2020-02-28 RX ORDER — NALOXONE HCL 0.4 MG/ML
0.4 VIAL (ML) INJECTION
Status: DISCONTINUED | OUTPATIENT
Start: 2020-02-28 | End: 2020-02-29 | Stop reason: HOSPADM

## 2020-02-28 RX ORDER — PANTOPRAZOLE SODIUM 40 MG/1
40 TABLET, DELAYED RELEASE ORAL
Status: DISCONTINUED | OUTPATIENT
Start: 2020-02-29 | End: 2020-02-29 | Stop reason: HOSPADM

## 2020-02-28 RX ORDER — METOPROLOL SUCCINATE 50 MG/1
50 TABLET, EXTENDED RELEASE ORAL DAILY
Status: DISCONTINUED | OUTPATIENT
Start: 2020-02-28 | End: 2020-02-29 | Stop reason: HOSPADM

## 2020-02-28 RX ORDER — GABAPENTIN 300 MG/1
600 CAPSULE ORAL EVERY 12 HOURS SCHEDULED
Status: DISCONTINUED | OUTPATIENT
Start: 2020-02-28 | End: 2020-02-29 | Stop reason: HOSPADM

## 2020-02-28 RX ORDER — HYDROCODONE BITARTRATE AND ACETAMINOPHEN 5; 325 MG/1; MG/1
1 TABLET ORAL EVERY 4 HOURS PRN
Status: DISCONTINUED | OUTPATIENT
Start: 2020-02-28 | End: 2020-02-29 | Stop reason: HOSPADM

## 2020-02-28 RX ORDER — ALPRAZOLAM 0.5 MG/1
0.5 TABLET ORAL 2 TIMES DAILY PRN
Status: DISCONTINUED | OUTPATIENT
Start: 2020-02-28 | End: 2020-02-29 | Stop reason: HOSPADM

## 2020-02-28 RX ORDER — HYDRALAZINE HYDROCHLORIDE 20 MG/ML
INJECTION INTRAMUSCULAR; INTRAVENOUS AS NEEDED
Status: DISCONTINUED | OUTPATIENT
Start: 2020-02-28 | End: 2020-02-28 | Stop reason: HOSPADM

## 2020-02-28 RX ORDER — BISACODYL 10 MG
10 SUPPOSITORY, RECTAL RECTAL DAILY PRN
Status: DISCONTINUED | OUTPATIENT
Start: 2020-02-28 | End: 2020-02-29 | Stop reason: HOSPADM

## 2020-02-28 RX ORDER — LISINOPRIL 5 MG/1
5 TABLET ORAL DAILY
Status: DISCONTINUED | OUTPATIENT
Start: 2020-02-28 | End: 2020-02-29 | Stop reason: HOSPADM

## 2020-02-28 RX ORDER — SODIUM CHLORIDE 9 MG/ML
50 INJECTION, SOLUTION INTRAVENOUS CONTINUOUS
Status: ACTIVE | OUTPATIENT
Start: 2020-02-28 | End: 2020-02-29

## 2020-02-28 RX ORDER — ONDANSETRON 2 MG/ML
4 INJECTION INTRAMUSCULAR; INTRAVENOUS EVERY 6 HOURS PRN
Status: DISCONTINUED | OUTPATIENT
Start: 2020-02-28 | End: 2020-02-29 | Stop reason: HOSPADM

## 2020-02-28 RX ORDER — HEPARIN SODIUM 1000 [USP'U]/ML
INJECTION, SOLUTION INTRAVENOUS; SUBCUTANEOUS AS NEEDED
Status: DISCONTINUED | OUTPATIENT
Start: 2020-02-28 | End: 2020-02-28 | Stop reason: HOSPADM

## 2020-02-28 RX ADMIN — BACLOFEN 20 MG: 10 TABLET ORAL at 23:31

## 2020-02-28 RX ADMIN — ALPRAZOLAM 0.5 MG: 0.5 TABLET ORAL at 14:31

## 2020-02-28 RX ADMIN — OXYCODONE HYDROCHLORIDE AND ACETAMINOPHEN 1 TABLET: 10; 325 TABLET ORAL at 15:18

## 2020-02-28 RX ADMIN — GABAPENTIN 600 MG: 300 CAPSULE ORAL at 15:18

## 2020-02-28 RX ADMIN — ROSUVASTATIN CALCIUM 20 MG: 20 TABLET, FILM COATED ORAL at 15:18

## 2020-02-28 RX ADMIN — BACLOFEN 20 MG: 10 TABLET ORAL at 18:39

## 2020-02-28 RX ADMIN — ISOSORBIDE MONONITRATE 30 MG: 30 TABLET ORAL at 15:18

## 2020-02-28 RX ADMIN — LISINOPRIL 5 MG: 5 TABLET ORAL at 15:18

## 2020-02-28 RX ADMIN — SODIUM CHLORIDE 75 ML/HR: 9 INJECTION, SOLUTION INTRAVENOUS at 10:30

## 2020-02-28 RX ADMIN — METOPROLOL SUCCINATE 50 MG: 50 TABLET, EXTENDED RELEASE ORAL at 15:19

## 2020-02-28 RX ADMIN — ESCITALOPRAM 10 MG: 10 TABLET, FILM COATED ORAL at 21:24

## 2020-02-29 VITALS
TEMPERATURE: 98 F | HEART RATE: 56 BPM | SYSTOLIC BLOOD PRESSURE: 135 MMHG | HEIGHT: 65 IN | OXYGEN SATURATION: 95 % | BODY MASS INDEX: 26.66 KG/M2 | RESPIRATION RATE: 17 BRPM | DIASTOLIC BLOOD PRESSURE: 82 MMHG | WEIGHT: 160 LBS

## 2020-02-29 LAB
ANION GAP SERPL CALCULATED.3IONS-SCNC: 12.8 MMOL/L (ref 5–15)
BUN BLD-MCNC: 8 MG/DL (ref 6–20)
BUN/CREAT SERPL: 13.6 (ref 7–25)
CALCIUM SPEC-SCNC: 8.5 MG/DL (ref 8.6–10.5)
CHLORIDE SERPL-SCNC: 104 MMOL/L (ref 98–107)
CHOLEST SERPL-MCNC: 130 MG/DL (ref 0–200)
CO2 SERPL-SCNC: 23.2 MMOL/L (ref 22–29)
CREAT BLD-MCNC: 0.59 MG/DL (ref 0.57–1)
DEPRECATED RDW RBC AUTO: 48.9 FL (ref 37–54)
ERYTHROCYTE [DISTWIDTH] IN BLOOD BY AUTOMATED COUNT: 17.3 % (ref 12.3–15.4)
GFR SERPL CREATININE-BSD FRML MDRD: 105 ML/MIN/1.73
GLUCOSE BLD-MCNC: 102 MG/DL (ref 65–99)
HBA1C MFR BLD: 6.8 % (ref 4.8–5.6)
HCT VFR BLD AUTO: 35.3 % (ref 34–46.6)
HDLC SERPL-MCNC: 40 MG/DL (ref 40–60)
HGB BLD-MCNC: 11.1 G/DL (ref 12–15.9)
LDLC SERPL CALC-MCNC: 57 MG/DL (ref 0–100)
LDLC/HDLC SERPL: 1.42 {RATIO}
MCH RBC QN AUTO: 24.7 PG (ref 26.6–33)
MCHC RBC AUTO-ENTMCNC: 31.4 G/DL (ref 31.5–35.7)
MCV RBC AUTO: 78.4 FL (ref 79–97)
PLATELET # BLD AUTO: 157 10*3/MM3 (ref 140–450)
PMV BLD AUTO: 11.1 FL (ref 6–12)
POTASSIUM BLD-SCNC: 3.9 MMOL/L (ref 3.5–5.2)
RBC # BLD AUTO: 4.5 10*6/MM3 (ref 3.77–5.28)
SODIUM BLD-SCNC: 140 MMOL/L (ref 136–145)
TRIGL SERPL-MCNC: 167 MG/DL (ref 0–150)
VLDLC SERPL-MCNC: 33.4 MG/DL (ref 5–40)
WBC NRBC COR # BLD: 7.57 10*3/MM3 (ref 3.4–10.8)

## 2020-02-29 PROCEDURE — 80061 LIPID PANEL: CPT | Performed by: INTERNAL MEDICINE

## 2020-02-29 PROCEDURE — 99217 PR OBSERVATION CARE DISCHARGE MANAGEMENT: CPT | Performed by: NURSE PRACTITIONER

## 2020-02-29 PROCEDURE — 63710000001 GABAPENTIN 300 MG CAPSULE: Performed by: INTERNAL MEDICINE

## 2020-02-29 PROCEDURE — 83036 HEMOGLOBIN GLYCOSYLATED A1C: CPT | Performed by: INTERNAL MEDICINE

## 2020-02-29 PROCEDURE — A9270 NON-COVERED ITEM OR SERVICE: HCPCS | Performed by: INTERNAL MEDICINE

## 2020-02-29 PROCEDURE — 63710000001 PANTOPRAZOLE 40 MG TABLET DELAYED-RELEASE: Performed by: INTERNAL MEDICINE

## 2020-02-29 PROCEDURE — 85027 COMPLETE CBC AUTOMATED: CPT | Performed by: INTERNAL MEDICINE

## 2020-02-29 PROCEDURE — 63710000001 ASPIRIN 81 MG CHEWABLE TABLET: Performed by: INTERNAL MEDICINE

## 2020-02-29 PROCEDURE — 93010 ELECTROCARDIOGRAM REPORT: CPT | Performed by: INTERNAL MEDICINE

## 2020-02-29 PROCEDURE — 80048 BASIC METABOLIC PNL TOTAL CA: CPT | Performed by: INTERNAL MEDICINE

## 2020-02-29 PROCEDURE — 63710000001 ISOSORBIDE MONONITRATE 30 MG TABLET SUSTAINED-RELEASE 24 HOUR: Performed by: INTERNAL MEDICINE

## 2020-02-29 PROCEDURE — 63710000001 PRASUGREL 10 MG TABLET: Performed by: INTERNAL MEDICINE

## 2020-02-29 PROCEDURE — 63710000001 OXYCODONE-ACETAMINOPHEN 10-325 MG TABLET: Performed by: INTERNAL MEDICINE

## 2020-02-29 PROCEDURE — 63710000001 METOPROLOL SUCCINATE XL 50 MG TABLET SUSTAINED-RELEASE 24 HOUR: Performed by: INTERNAL MEDICINE

## 2020-02-29 PROCEDURE — 63710000001 BACLOFEN 10 MG TABLET: Performed by: INTERNAL MEDICINE

## 2020-02-29 PROCEDURE — 93005 ELECTROCARDIOGRAM TRACING: CPT | Performed by: INTERNAL MEDICINE

## 2020-02-29 PROCEDURE — 63710000001 LISINOPRIL 5 MG TABLET: Performed by: INTERNAL MEDICINE

## 2020-02-29 PROCEDURE — G0378 HOSPITAL OBSERVATION PER HR: HCPCS

## 2020-02-29 PROCEDURE — 63710000001 ROSUVASTATIN 20 MG TABLET: Performed by: INTERNAL MEDICINE

## 2020-02-29 RX ORDER — PRASUGREL 10 MG/1
10 TABLET, FILM COATED ORAL DAILY
Qty: 30 TABLET | Refills: 11 | Status: SHIPPED | OUTPATIENT
Start: 2020-03-01 | End: 2021-05-10

## 2020-02-29 RX ORDER — ASPIRIN 81 MG/1
81 TABLET, CHEWABLE ORAL DAILY
Qty: 30 TABLET | Refills: 11 | Status: SHIPPED | OUTPATIENT
Start: 2020-03-01

## 2020-02-29 RX ORDER — NICOTINE 21 MG/24HR
1 PATCH, TRANSDERMAL 24 HOURS TRANSDERMAL EVERY 24 HOURS
Qty: 28 EACH | Refills: 1 | Status: SHIPPED | OUTPATIENT
Start: 2020-02-29 | End: 2021-11-15

## 2020-02-29 RX ADMIN — METOPROLOL SUCCINATE 50 MG: 50 TABLET, EXTENDED RELEASE ORAL at 08:21

## 2020-02-29 RX ADMIN — ASPIRIN 81 MG: 81 TABLET, CHEWABLE ORAL at 08:21

## 2020-02-29 RX ADMIN — OXYCODONE HYDROCHLORIDE AND ACETAMINOPHEN 1 TABLET: 10; 325 TABLET ORAL at 08:25

## 2020-02-29 RX ADMIN — ROSUVASTATIN CALCIUM 20 MG: 20 TABLET, FILM COATED ORAL at 08:21

## 2020-02-29 RX ADMIN — OXYCODONE HYDROCHLORIDE AND ACETAMINOPHEN 1 TABLET: 10; 325 TABLET ORAL at 15:19

## 2020-02-29 RX ADMIN — LISINOPRIL 5 MG: 5 TABLET ORAL at 08:21

## 2020-02-29 RX ADMIN — ISOSORBIDE MONONITRATE 30 MG: 30 TABLET ORAL at 08:21

## 2020-02-29 RX ADMIN — PRASUGREL 10 MG: 10 TABLET, FILM COATED ORAL at 08:21

## 2020-02-29 RX ADMIN — OXYCODONE HYDROCHLORIDE AND ACETAMINOPHEN 1 TABLET: 10; 325 TABLET ORAL at 01:13

## 2020-02-29 RX ADMIN — BACLOFEN 20 MG: 10 TABLET ORAL at 15:19

## 2020-02-29 RX ADMIN — PANTOPRAZOLE SODIUM 40 MG: 40 TABLET, DELAYED RELEASE ORAL at 08:22

## 2020-02-29 RX ADMIN — BACLOFEN 20 MG: 10 TABLET ORAL at 08:25

## 2020-02-29 RX ADMIN — GABAPENTIN 600 MG: 300 CAPSULE ORAL at 08:22

## 2020-03-01 NOTE — DISCHARGE SUMMARY
Hospital Discharge    Patient Name: Grace Mcclain  Age/Sex: 59 y.o. female  : 1960  MRN: [id number]    Encounter Provider: CHRISTI Jeffrey  Referring Provider: Saman Dyer MD  Place of Service: [facility] CARDIOLOGY  Patient Care Team:  Yobani Mccain MD as PCP - General (Family Medicine)         Date of Discharge:  2020   Date of Admit: 2020    Discharge Condition: Stable  Discharge Diagnosis:    Coronary artery disease involving native coronary artery of native heart with angina pectoris (CMS/[facility])      Hospital Course:   Grace Mcclain is a 59 y.o. female of Dr. OCAMPO with a history of acute anterior MRI treated with LAD stenting, cardiac catheterization  for recurrent angina with mild in-stent restenosis but nothing significant noted and started on Imdur at that time, diabetes, PE current every day smoker, back pain, GERD, hypertension,, Ménière's who presented for cardiac catheterization for abnormal stress test and ischemic cardiomyopathy.    2020 she presented to see Dr. Mack in the office.  For the last 3 to 4 months she was having central chest pain associated with dyspnea, diaphoresis, lightheadedness after walking 10 or 15 minutes or up multiple flights of stairs that would resolve within a few minutes of rest.  Because of her symptoms nuclear stress test was ordered and performed on 2020 which was abnormal indicating medium sized infarct in the anterior wall and apex as well as the medium sized moderately severe area of ischemia located in the anterior wall and apex as well as a medium sized mildly severe area of ischemia located in the inferior wall she had severe hypokinesis of the anterior wall and an EF noted to be low at 35%.  2020 she was found to have 90% i DL/proximal in-stent restenosis of the LAD treated with eluting stent placement she also was noted to have a discrete ostial stenosis of first diagonal branch  and discrete 70% mid to distal stenosis of the LAD treated with drug-eluting stent placement, she also had discrete 90% stenosis of the mid RCA treated with drug-eluting stent placement.    She did well overnight.  EKG was stable with some ST T wave abnormalities in anterior lateral leads.  She ambulated the unit several times without chest pain, pressure, tightness.  She had mild dyspnea that was better.  Her labs the day of discharge were stable and showed mild stable microcytic anemia with hemoglobin 11.1, normal hematocrit, MCV 78.4.  She can follow-up with her PCP regarding further monitoring of anemia, renal function stable HgbA1c 6.8%, lipid panel showed elevated triglycerides 167, LDL good 57, HDL 40, total cholesterol 130.  She was discharged on current medical regimen with Effient added as second antiplatelet agent.  It was confirmed that her pharmacy had it in stock and would be affordable for the patient.  She will resume her metformin 3/2/2020.  He was referred to cardiac rehab.  She will need to follow-up with her PCP in a couple weeks.  She will follow-up with me in the office in 1 week and we will plan to perform an outpatient echocardiogram due to her ischemic cardiomyopathy.  She is on GDMT with metoprolol succinate as well as lisinopril and her vital signs were stable at discharge.  Was educated on risk factor modification including smoking cessation.  Nicotine patches were called in separately to her pharmacy and not listed on the discharge med list.  She will remain off work until reevaluated at her 1 week office appointment.    Objective:  Temp:  [97.7 °F (36.5 °C)-98.4 °F (36.9 °C)] 98 °F (36.7 °C)  Heart Rate:  [56-83] 56  Resp:  [17-18] 17  BP: (115-157)/(60-82) 135/82    Intake/Output Summary (Last 24 hours) at 2/29/2020 1932  Last data filed at 2/29/2020 1155  Gross per 24 hour   Intake 1933.58 ml   Output --   Net 1933.58 ml     Body mass index is 26.63 kg/m².      02/28/20  1022    Weight: 72.6 kg (160 lb)     Weight change:     Physical Exam:          General Appearance:    Alert, cooperative, in no acute distress, jeromy skin with some scraps and bruises along bilateral arms and legs and redness along cheeks and nose   Lungs:     Scattered expiratory wheezes,  Normal respiratory effort and rate.      Heart:    Regular rhythm and normal rate, normal S1 and S2, no murmurs, gallops or rubs.     Chest Wall:    No abnormalities observed   Abdomen:     Soft, nontender, positive bowel sounds.     Extremities:   no cyanosis, clubbing or edema. Right radial cath site well healed without erythema or ecchymosis, palpable proximal and distal pulses, good capillary refill, good motor function of hand, no thrill or bruit detected, site is soft and non-tender with no hematoma, no sensory deficits noted.  Palpable radial/dp pulses bilaterally.           Procedures Performed  Procedure(s):  Left Heart Cath  Left ventriculography  Stent VIC coronary  Coronary angiography  Percutaneous Coronary Intervention       Consults:  Consults     No orders found for last 30 day(s).          Pertinent Test Results:  Results from last 7 days   Lab Units 02/29/20  0453 02/27/20  1356   SODIUM mmol/L 140 137   POTASSIUM mmol/L 3.9 4.5   CHLORIDE mmol/L 104 103   CO2 mmol/L 23.2 24.5   BUN mg/dL 8 12   CREATININE mg/dL 0.59 0.74   GLUCOSE mg/dL 102* 158*   CALCIUM mg/dL 8.5* 8.6         Results from last 7 days   Lab Units 02/29/20  0453 02/27/20  1356   WBC 10*3/mm3 7.57 9.48   HEMOGLOBIN g/dL 11.1* 11.5*   HEMATOCRIT % 35.3 37.1   PLATELETS 10*3/mm3 157 180             Results from last 7 days   Lab Units 02/29/20  0453   CHOLESTEROL mg/dL 130   TRIGLYCERIDES mg/dL 167*   HDL CHOL mg/dL 40               Discharge Medications     Discharge Medications      New Medications      Instructions Start Date   prasugrel 10 MG tablet  Commonly known as:  EFFIENT   10 mg, Oral, Daily   Start Date:  March 1, 2020        Continue  These Medications      Instructions Start Date   ALPRAZolam 0.5 MG tablet  Commonly known as:  XANAX   0.5 mg, Oral, 2 Times Daily PRN      aspirin 81 MG chewable tablet   81 mg, Oral, Daily   Start Date:  March 1, 2020     baclofen 20 MG tablet  Commonly known as:  LIORESAL   20 mg, Oral, 3 Times Daily      escitalopram 10 MG tablet  Commonly known as:  LEXAPRO   Oral, Every Night at Bedtime      gabapentin 600 MG tablet  Commonly known as:  NEURONTIN   600 mg, Oral, 2 Times Daily      isosorbide mononitrate 30 MG 24 hr tablet  Commonly known as:  IMDUR   30 mg, Oral, Daily      lisinopril 5 MG tablet  Commonly known as:  PRINIVIL,ZESTRIL   5 mg, Oral, Daily      metFORMIN  MG 24 hr tablet  Commonly known as:  GLUCOPHAGE-XR   1,000 mg, Oral, Daily With Breakfast      metoprolol succinate XL 50 MG 24 hr tablet  Commonly known as:  TOPROL-XL   Oral, Daily      oxyCODONE-acetaminophen  MG per tablet  Commonly known as:  PERCOCET   1 tablet, Oral, Every 6 Hours PRN      pantoprazole 40 MG EC tablet  Commonly known as:  PROTONIX   40 mg, Oral, 2 Times Daily      rosuvastatin 20 MG tablet  Commonly known as:  CRESTOR   20 mg, Oral, Daily      tiZANidine 4 MG tablet  Commonly known as:  ZANAFLEX   Oral, As Needed         Stop These Medications    estradiol 1 MG tablet  Commonly known as:  ESTRACE     hydroCHLOROthiazide 25 MG tablet  Commonly known as:  HYDRODIURIL            Discharge Diet:    Dietary Orders (From admission, onward)     Start     Ordered    02/28/20 1316  Diet Regular; Consistent Carbohydrate, Cardiac  Diet Effective Now     Question Answer Comment   Diet Texture / Consistency Regular    Common Modifiers Consistent Carbohydrate    Common Modifiers Cardiac        02/28/20 1315                Activity at Discharge:    Activity Instructions     Activity as tolerated            Routine post cardiac catheterization/PCI discharge home care instructions:    1. No submerging procedure site below  water for 7-10 days.  2. No lifting objects greater than 1 lbs for 3 days.  3. If groin site used, avoid climbing several flights of stairs or sitting for longer than 2 hours at a time for the next 24 hours.   4. Monitor puncture site for bleeding and/or knots;. If bleeding should occur at the groin site: lie flat, apply pressure and return to the ER. If bleeding should occur at the wrist site, apply pressure and return to the ER.  5.  You may apply a DRY Band-Aid over the puncture site if needed. Do not apply any lotions, salves or ointments to site.  6. No driving for 3 days.  7. Return to ER for recurrent symptoms.  8. No smoking.  9. Take all medications as prescribed.  10. Remain off work until re-evaluated in the office.    Discharge disposition: home     Discharge Instructions and Follow ups:  Future Appointments   Date Time Provider Department Center   8/14/2020  3:15 PM Ana Mack MD [facility] None     Additional Instructions for the Follow-ups that You Need to Schedule     Ambulatory Referral to Cardiac Rehab   As directed        Follow-up Information     [facility] .    Specialty:  Cardiac Rehabilitation  Contact information:  [address] [address] [phone]           Yobani Mccain MD Follow up.    Specialty:  Family Medicine  Why:  within 2 weeks  Contact information:  [address]  [facility] [address] [phone]             Saman Dyer MD Follow up.    Specialty:  Cardiology  Why:  make an appt  nurse practitioner in 1 week  cardiologist in 1 month  Office will call next week  Contact information:  [address]  [facility] [address]  [phone]                   Test Results Pending at Discharge: None, plan to order echo in office at follow up appt.     CHRISTI Jeffrey  02/29/20  7:32 PM    Time: Discharge 35 min    EMR Dragon/Transcription disclaimer:   Much of this encounter note is an  electronic transcription/translation of spoken language to printed text. The electronic translation of spoken language may permit erroneous, or at times, nonsensical words or phrases to be inadvertently transcribed; Although I have reviewed the note for such errors, some may still exist.

## 2020-03-03 ENCOUNTER — OFFICE VISIT (OUTPATIENT)
Dept: CARDIAC REHAB | Facility: HOSPITAL | Age: 60
End: 2020-03-03

## 2020-03-03 ENCOUNTER — TELEPHONE (OUTPATIENT)
Dept: CARDIOLOGY | Facility: CLINIC | Age: 60
End: 2020-03-03

## 2020-03-03 ENCOUNTER — TELEPHONE (OUTPATIENT)
Dept: CARDIAC REHAB | Facility: HOSPITAL | Age: 60
End: 2020-03-03

## 2020-03-03 VITALS
SYSTOLIC BLOOD PRESSURE: 170 MMHG | DIASTOLIC BLOOD PRESSURE: 88 MMHG | HEIGHT: 65 IN | OXYGEN SATURATION: 96 % | HEART RATE: 55 BPM | WEIGHT: 159.6 LBS | BODY MASS INDEX: 26.59 KG/M2

## 2020-03-03 DIAGNOSIS — Z95.5 S/P DRUG ELUTING CORONARY STENT PLACEMENT: Primary | ICD-10-CM

## 2020-03-03 PROCEDURE — 93797 PHYS/QHP OP CAR RHAB WO ECG: CPT

## 2020-03-03 NOTE — TELEPHONE ENCOUNTER
Okay.  There is also another telephone encounter from cardiac rehab nurse today reporting high blood pressure and I will need to discuss that with her when she calls back as well.  Thanks.

## 2020-03-03 NOTE — TELEPHONE ENCOUNTER
Pt left a voice mail requesting to speak with you regarding a issue she has with her work, candido

## 2020-03-03 NOTE — TELEPHONE ENCOUNTER
I tried returning the patient's call earlier for another reason and had to leave a message. I did try to reach her again just now.  When I am able to speak with her I will plan on having her increase her lisinopril from 5 to 10 mg daily.  She does have a scheduled appointment with me 3/6/2020. If I cannot get a hold of her before we will plan to address at her upcoming appointment.

## 2020-03-03 NOTE — TELEPHONE ENCOUNTER
Nadia- I left a message for this patient.  Please try to reach out to her later to try to find out what she is needing regarding her work.

## 2020-03-03 NOTE — PROGRESS NOTES
"Cardiac Rehab Initial Assessment      Name: Grace Mcclain  :1960 Allergies:Patient has no known allergies.   MRN: [id number] 59 y.o. Physician: Yobani Mccain MD   Primary Diagnosis:    Diagnosis Plan   1. S/P drug eluting coronary stent placement x 3     Event Date: 2020 Specialist: FLORENCE Mack   Secondary Diagnosis:    Risk Stratification:High Risk Note Author: Johanna Ramirez RN     Cardiovascular History: Previous MI and Previous PCI and stent to LAD in .  Had Pulmonary embolism in .  Pt says she had an \"ablation\" in either  or , but doesn't know what the heart rhythm was.  Thinks it was just for a rapid HR, not irregular rhythm.  Went through cardiac rehab in past at [facility].       EXERCISE AT HOME  no        EF: 35%      Source: myocardial perfusion stress test          Ambulatory Status:Independent  Ambulatory Fall Risk Assessed on Initial Visit: yes 6 Minute Walk Pre- Cardiac Rehab:  Distance:1535ft      RPE:2  Max. HR: 78       SPO2:94-98    MET: 3.2  MPH: 2.9             RPD: 0.5  Resting BP: 170/88 LA, 170/84 RA    Peak BP: 184/90  Recovery BP: 180/90  Comments: Walked full 6 minutes without stopping.  Denies any problems with walk.  Thinks she could walk more time.  Has a h/o Meniere's, but no balance problem noted. Pt says she can at times not walk straight.  Would like to try a treadmill. Messaged Dr Mack and CHRISTI Bailey about B/P readings.      NUTRITION  Lipids:yes If yes, labs as follows;  Total: No components found for: CHOLESTEROL  HDL:   HDL Cholesterol   Date Value Ref Range Status   2020 40 40 - 60 mg/dL Final    Lipids continued:  LDL:  LDL Cholesterol    Date Value Ref Range Status   2020 57 0 - 100 mg/dL Final     Triglyceride: No components found for: TRIGLYCERIDE   Weight Management:                 Weight: 159.6 lb  Height: 65 in                                   BMI: Body mass index is 26.56 kg/m².  Waist Circumference: 38  inches "   Alcohol Use: none Diabetes:Yes,  Monitors BS at home- no, Frequency: NA, Random BS: NA  Pt's doctor told her she does not need a meter and does not need to check her blood sugar    Last HGBA1C with date if applicable:No components found for: A1C         SOCIAL HISTORY  Social History     Socioeconomic History   • Marital status:      Spouse name: Not on file   • Number of children: Not on file   • Years of education: Not on file   • Highest education level: Not on file   Tobacco Use   • Smoking status: Current Every Day Smoker     Packs/day: 0.75   • Smokeless tobacco: Never Used   • Tobacco comment: daily caffeine use   Substance and Sexual Activity   • Alcohol use: No   • Drug use: No   • Sexual activity: Defer       Educational Level (choose one that applies) high school diploma/GED Works part-time in [employer] in [place].  Learning Barriers:Ready to Learn, doesn't think she remembers as well as she did before her MI in .     Family Support:yes    Living Arrangement: lives with their spouse    Risk Factors: Stress  Yes, Clinical Depression  Yes, Heredity  N/A If Yes Parents both  in car accident when she was 6 years old.  She has a sister and two brothers, but she is estranged from them.  Says her grandfather  of MI in his 50's. , Hyperlipidemia  Yes and Obesity  No    Pt says she is diabetic type 2 for 8 years.  She has not had a regular exercise program.  Tobacco Adjunct: No  Pt just quit 2020.  She has smoked since age 24.  She has quit several times in past. Has been quit 16 months and also was quit for 2 years.  Always did [place].  Pt is doing [place] now, but also can use NRP if needed according to her doctor.  Offered smoking cessation program information in [place] if needed and recommended 1 800- Quit Now.       Comorbidities: Diabetes Mellitus      Previous MI (see above)    Meniere's     GERD    Pulmonary embolism    Long discussion about stopping  smoking and managing diabetes.  Reviewed all risk factors.  Also reviewed importance of medication compliance but especially ASA and prasugrel for stent patency.  Discussed waist circumference goals.  Also reviewed signs and symptoms of CHF, weight tracking, sodium limits.  Pt says she will get a scale for home use.     PSYCHOSOCIAL  Clinical Depression: yes    Stress: yes     Assess presence or absence of depression using a valid screening tool: yes      PHYSICAL ASSESSMENT  Influenza vaccine: no  Pneumococcal vaccine: yes          Angina: no    Describe angina scale of 0 - 4: 0 = none    Today are you having incisional pain? N/A. If, Yes, Scale:         Today are you having any other pain? No. If, Yes, Scale:     Will have back pain sometimes but manages with medication.  Diagnosed with Hypertension:yes    Heart Sounds: S1 S2 regular     Lung Sounds: normal air entry, lungs clear to auscultation         Assessment: Pleasant lady.  Alert and appropriate to discussion.   Orthopedic Problems: Had back surgery L4 L5 S1 with fusion 12/2006.  Had Fusion of C4 C5 C6 2/2006.  DDD. Torn left rotator cuff with surgical repair in 2006.  Left wrist carpal tunnel surgery in 2008.     Are you being hurt, hit, or frightened by anyone at home or in your life? no    Are you being neglected by a caregiver? N/A Shoulder flexibility/Range of motion: Average     Recommended arm activity: Any    Chair sit and reach within: 6 inches on right and 7 inches left.    Leg flexibility: Average    Leg Strength/Balance/Five times sit to stand: 12 seconds.     Chose one: Average    Recommended stretching: Standing    Assessment: Skin warm, dry, pink.     Family attends [place]: yes Cousin Time of arrival: 1350  Time of departure: 1520     Patient Goals: MET 4-5 Develop home exercise of walking at least 150 minutes per week.  Stay smoke free for life.  Lose 2-4 lbs in program with goal weight of 130 lbs.  Be able to do all housework, gardening  including using a tiller.  Increase my energy.  Learn more about heart healthy/ diabetic diet.  Learn more about managing stress.          3/3/2020  2:22 PM  Johanna Ramirez RN

## 2020-03-06 ENCOUNTER — OFFICE VISIT (OUTPATIENT)
Dept: CARDIOLOGY | Facility: CLINIC | Age: 60
End: 2020-03-06

## 2020-03-06 VITALS
HEIGHT: 65 IN | SYSTOLIC BLOOD PRESSURE: 140 MMHG | WEIGHT: 157.2 LBS | BODY MASS INDEX: 26.19 KG/M2 | DIASTOLIC BLOOD PRESSURE: 90 MMHG | HEART RATE: 59 BPM

## 2020-03-06 DIAGNOSIS — E78.2 MIXED HYPERLIPIDEMIA: ICD-10-CM

## 2020-03-06 DIAGNOSIS — Z95.5 S/P DRUG ELUTING CORONARY STENT PLACEMENT: ICD-10-CM

## 2020-03-06 DIAGNOSIS — I25.5 ISCHEMIC CARDIOMYOPATHY: ICD-10-CM

## 2020-03-06 DIAGNOSIS — I25.2 HX OF MYOCARDIAL INFARCTION: ICD-10-CM

## 2020-03-06 DIAGNOSIS — E11.59 CONTROLLED TYPE 2 DIABETES MELLITUS WITH OTHER CIRCULATORY COMPLICATION, WITH LONG-TERM CURRENT USE OF INSULIN (HCC): ICD-10-CM

## 2020-03-06 DIAGNOSIS — I10 ESSENTIAL HYPERTENSION: ICD-10-CM

## 2020-03-06 DIAGNOSIS — F17.210 CIGARETTE NICOTINE DEPENDENCE WITHOUT COMPLICATION: ICD-10-CM

## 2020-03-06 DIAGNOSIS — Z79.4 CONTROLLED TYPE 2 DIABETES MELLITUS WITH OTHER CIRCULATORY COMPLICATION, WITH LONG-TERM CURRENT USE OF INSULIN (HCC): ICD-10-CM

## 2020-03-06 DIAGNOSIS — I25.119 CORONARY ARTERY DISEASE INVOLVING NATIVE CORONARY ARTERY OF NATIVE HEART WITH ANGINA PECTORIS (HCC): Primary | ICD-10-CM

## 2020-03-06 DIAGNOSIS — Z71.6 TOBACCO ABUSE COUNSELING: ICD-10-CM

## 2020-03-06 PROBLEM — G89.29 CHRONIC BILATERAL LOW BACK PAIN WITH SCIATICA: Status: ACTIVE | Noted: 2017-05-02

## 2020-03-06 PROBLEM — M54.40 CHRONIC BILATERAL LOW BACK PAIN WITH SCIATICA: Status: ACTIVE | Noted: 2017-05-02

## 2020-03-06 PROBLEM — E11.9 DIABETES TYPE 2, CONTROLLED: Status: ACTIVE | Noted: 2020-03-06

## 2020-03-06 PROBLEM — M54.2 NECK PAIN: Status: ACTIVE | Noted: 2017-05-02

## 2020-03-06 PROBLEM — M54.42 CHRONIC BILATERAL LOW BACK PAIN WITH SCIATICA: Status: ACTIVE | Noted: 2017-05-02

## 2020-03-06 PROBLEM — M54.12 CERVICAL RADICULOPATHY: Status: ACTIVE | Noted: 2017-03-26

## 2020-03-06 PROBLEM — M54.41 CHRONIC BILATERAL LOW BACK PAIN WITH SCIATICA: Status: ACTIVE | Noted: 2017-05-02

## 2020-03-06 PROCEDURE — 93000 ELECTROCARDIOGRAM COMPLETE: CPT | Performed by: NURSE PRACTITIONER

## 2020-03-06 PROCEDURE — 99214 OFFICE O/P EST MOD 30 MIN: CPT | Performed by: NURSE PRACTITIONER

## 2020-03-06 RX ORDER — NITROGLYCERIN 0.4 MG/1
TABLET SUBLINGUAL
Qty: 25 TABLET | Refills: 11 | Status: SHIPPED | OUTPATIENT
Start: 2020-03-06

## 2020-03-06 RX ORDER — PROMETHAZINE HYDROCHLORIDE 25 MG/1
25 TABLET ORAL EVERY 6 HOURS PRN
COMMUNITY

## 2020-03-06 RX ORDER — LISINOPRIL 5 MG/1
5 TABLET ORAL 2 TIMES DAILY
Qty: 60 TABLET | Refills: 2 | Status: SHIPPED | OUTPATIENT
Start: 2020-03-06 | End: 2020-04-29

## 2020-03-06 NOTE — PROGRESS NOTES
Date of Office Visit: 2020  Encounter Provider: CHRISTI Jeffrey  Primary Cardiologist: Dr. Mack  Place of Service: [facility] CARDIOLOGY  Patient Name: Grace Mcclain  :1960      Subjective:     Chief Complaint:  Post PCI hospital follow up    History of Present Illness:  Grace Mcclain is a pleasant 59 y.o. female who is new to me .  Outside records have been obtained and reviewed by me.     She is a patient of Dr. Mack with a history of acute anterior MI treated with LAD stenting, cardiac catheterization  for recurrent angina with mild in-stent restenosis but nothing significant noted and started on Imdur at that time, diabetes, PE, current every day smoker, chronic neck/back pain, GERD, hypertension, Ménière's.    2020 she presented to see Dr. Mack in the office.  For the last 3 to 4 months she was having central chest pain associated with dyspnea, diaphoresis, lightheadedness after walking 10 or 15 minutes or up multiple flights of stairs that would resolve within a few minutes of rest.  Because of her symptoms nuclear stress test was ordered and performed on 2020 which was abnormal indicating medium sized infarct in the anterior wall and apex as well as the medium sized moderately severe area of ischemia located in the anterior wall and apex as well as a medium sized mildly severe area of ischemia located in the inferior wall she had severe hypokinesis of the anterior wall and an EF noted to be low at 35%.      2020 she was found to have 90% ostial/proximal in-stent restenosis of the LAD treated with eluting stent placement she also was noted to have a discrete ostial stenosis of first diagonal branch and discrete 70% mid to distal stenosis of the LAD treated with drug-eluting stent placement, she also had discrete 90% stenosis of the mid RCA treated with drug-eluting stent placement.  She did well overnight post-cath EKG was stable with some  ST-T wave abnormalities in anterior lateral leads.  She ambulated the unit several times without chest pain, pressure, tightness.  She had mild dyspnea that was better.  Her labs the day of discharge were stable and showed mild stable microcytic anemia with hemoglobin 11.1, normal hematocrit, MCV 78.4., renal function stable HgbA1c 6.8%, lipid panel showed elevated triglycerides 167, LDL good 57, HDL 40, total cholesterol 130.  She was discharged on current medical regimen with Effient added as second antiplatelet agent.  She was educated on risk factor modification including smoking cessation. She was referred to cardiac rehab.  She was educated to follow up with her PCP in a couple weeks who could follow-up on her anemia. I recommended  performing an outpatient echocardiogram due to her ischemic cardiomyopathy.  She was on GDMT with metoprolol succinate as well as lisinopril and her vital signs were stable at discharge.  She was to remain off work until reevaluated at her 1 week office appointment.     She started Cardiac rehab a few days ago her blood pressure was noted to be high.  I attempted to reach her to try to increase her lisinopril but was unsuccessful in reaching her.    She is presenting today for hospital follow-up post PCI.  She has several chronic complaints secondary to chronic back and neck pain.  She denies further chest pain associated with dyspnea diaphoresis or lightheadedness however she reports she really has not done months.  She did do a 6-minute walk tach stat cardiac rehab and felt good with that without significant shortness of breath.  She has chronic occasional palpitations described as a quick flutter that is not new worsening or associated with other significant symptoms.  She has intermittent lower extremity edema that is actually improved and nearly resolved since her heart cath and multiple stenting.  She has chronic vertigo/dizziness type symptoms secondary to her Ménière's  disease.  It is not been worse post cast she is not had any syncope or near syncope.  She reports she finally got her old blood pressure cuff that she went to a family friend.  She had a change the batteries.  She checked her blood pressure at this morning it was 148/70.  She does have a history of headaches secondary to back and neck pain but actually feels this is improved some since her heart cath.  She denies any abnormal bleeding with her Effient which was her only new prescription.  He reports in the past she was told she had microscopic hematuria on the urine study but this was many years ago.  She denies any maría hematuria.  She denies cough, fever, chills.  She states she will bring in her blood pressure machine to her cardiac rehab appointment so they can verify its accuracy.  Fortunately she has refrained from smoking since hospital discharge with use of nicotine patches.    Past Medical History:   Diagnosis Date   • CAD (coronary artery disease)    • Diabetes mellitus (CMS/[facility])    • Myocardial infarction (CMS/[facility])    • PE (pulmonary thromboembolism) (CMS/[facility])      Past Surgical History:   Procedure Laterality Date   • BACK SURGERY     • CARDIAC CATHETERIZATION N/A 2/28/2020    Procedure: Left Heart Cath;  Surgeon: Saman Dyer MD;  Location: [facility] INVASIVE LOCATION;  Service: Cardiovascular;  Laterality: N/A;   • CARDIAC CATHETERIZATION N/A 2/28/2020    Procedure: Left ventriculography;  Surgeon: Saman Dyer MD;  Location: [facility] INVASIVE LOCATION;  Service: Cardiovascular;  Laterality: N/A;   • CARDIAC CATHETERIZATION N/A 2/28/2020    Procedure: Stent VIC coronary;  Surgeon: Saman Dyer MD;  Location: [facility] INVASIVE LOCATION;  Service: Cardiovascular;  Laterality: N/A;  rca/lad   • CARDIAC CATHETERIZATION N/A 2/28/2020    Procedure: Coronary angiography;  Surgeon: Saman Dyer MD;  Location: [facility] INVASIVE LOCATION;  Service:  Cardiovascular;  Laterality: N/A;   • CARDIAC CATHETERIZATION N/A 2/28/2020    Procedure: Percutaneous Coronary Intervention;  Surgeon: Saman Dyer MD;  Location: [facility] INVASIVE LOC[facility];  Service: Cardiovascular;  Laterality: N/A;   • CARDIAC SURGERY     • EYE SURGERY     • HYSTERECTOMY     • NECK SURGERY     • SHOULDER SURGERY Left      Outpatient Medications Prior to Visit   Medication Sig Dispense Refill   • ALPRAZolam (XANAX) 0.5 MG tablet Take 0.5 mg by mouth 2 (Two) Times a Day As Needed for Anxiety.     • aspirin 81 MG chewable tablet Chew 1 tablet Daily. 30 tablet 11   • baclofen (LIORESAL) 20 MG tablet Take 20 mg by mouth 3 (Three) Times a Day.     • escitalopram (LEXAPRO) 10 MG tablet Take  by mouth every night at bedtime.  10   • gabapentin (NEURONTIN) 600 MG tablet Take 600 mg by mouth 2 (Two) Times a Day.     • isosorbide mononitrate (IMDUR) 30 MG 24 hr tablet Take 30 mg by mouth Daily.     • lisinopril (PRINIVIL,ZESTRIL) 5 MG tablet Take 5 mg by mouth Daily.     • metFORMIN ER (GLUCOPHAGE-XR) 500 MG 24 hr tablet Take 1,000 mg by mouth Daily With Breakfast.     • metoprolol succinate XL (TOPROL-XL) 50 MG 24 hr tablet Take  by mouth Daily.     • nicotine (NICODERM CQ) 21 MG/24HR patch Place 1 patch on the skin as directed by provider Daily. 28 each 1   • oxyCODONE-acetaminophen (PERCOCET)  MG per tablet Take 1 tablet by mouth Every 6 (Six) Hours As Needed for Moderate Pain .     • pantoprazole (PROTONIX) 40 MG EC tablet Take 40 mg by mouth 2 (Two) Times a Day.     • prasugrel (EFFIENT) 10 MG tablet Take 1 tablet by mouth Daily. 30 tablet 11   • promethazine (PHENERGAN) 25 MG tablet Take 25 mg by mouth Every 6 (Six) Hours As Needed for Nausea or Vomiting.     • rosuvastatin (CRESTOR) 20 MG tablet Take 20 mg by mouth Daily.     • tiZANidine (ZANAFLEX) 4 MG tablet Take  by mouth As Needed.  0     No facility-administered medications prior to visit.        Allergies as of 03/06/2020      • (No Known Allergies)     Social History     Socioeconomic History   • Marital status:      Spouse name: Jose Mcclain   • Number of children: 2   • Years of education: 12   • Highest education level: High school graduate   Occupational History   • Occupation: works part time for [employer] in [employer]   Tobacco Use   • Smoking status: Former Smoker     Packs/day: 0.50     Years: 36.00     Pack years: 18.00     Types: Cigarettes     Start date:      Last attempt to quit: 2020     Years since quittin.0   • Smokeless tobacco: Never Used   • Tobacco comment: daily caffeine use   Substance and Sexual Activity   • Alcohol use: No   • Drug use: No   • Sexual activity: Defer     History reviewed. No pertinent family history.  Review of Systems   Constitution: Negative for chills, fever and malaise/fatigue.   HENT: Negative for ear pain, hearing loss, nosebleeds and sore throat.    Eyes: Positive for blurred vision. Negative for double vision, pain, vision loss in left eye and vision loss in right eye.   Cardiovascular: Positive for dyspnea on exertion, leg swelling (intermittent- greatly improved since heart cath) and palpitations (occasional flutter- chronic and unchanged). Negative for chest pain, claudication, irregular heartbeat, near-syncope, orthopnea, paroxysmal nocturnal dyspnea and syncope.   Respiratory: Positive for shortness of breath and snoring. Negative for cough and wheezing.    Endocrine: Positive for cold intolerance and heat intolerance.   Hematologic/Lymphatic: Negative for bleeding problem.   Skin: Negative for color change, itching, rash and unusual hair distribution.   Musculoskeletal: Positive for back pain, joint pain and neck pain. Negative for joint swelling.   Gastrointestinal: Negative for abdominal pain, diarrhea, hematochezia, melena, nausea and vomiting.   Genitourinary: Positive for hematuria (hx of microscopic hematuria). Negative for decreased libido,  "frequency, hesitancy and incomplete emptying.   Neurological: Positive for excessive daytime sleepiness, dizziness (meniere's disease), headaches, light-headedness, paresthesias and vertigo. Negative for loss of balance, numbness and seizures.   Psychiatric/Behavioral: Positive for depression.          Objective:     Vitals:    03/06/20 1336   BP: 140/90   BP Location: Left arm   Patient Position: Sitting   Cuff Size: Adult   Pulse: 59   Weight: 71.3 kg (157 lb 3.2 oz)   Height: 165.1 cm (65\")     Body mass index is 26.16 kg/m².    PHYSICAL EXAM:  Physical Exam   Constitutional: She is oriented to person, place, and time. She appears well-developed and well-nourished. No distress.   HENT:   Head: Normocephalic and atraumatic.   Eyes: Pupils are equal, round, and reactive to light. Conjunctivae and EOM are normal.   Neck: No JVD present. Carotid bruit is not present.   Cardiovascular: Normal rate, regular rhythm, normal heart sounds and intact distal pulses.   No murmur heard.  Pulses:       Radial pulses are 2+ on the right side, and 2+ on the left side.        Dorsalis pedis pulses are 2+ on the right side, and 2+ on the left side.        Posterior tibial pulses are 2+ on the right side, and 2+ on the left side.   Right radial cath site well healed without erythema or ecchymosis, palpable proximal and distal pulses, good capillary refill, good motor function of hand, no thrill or bruit detected, site is soft and non-tender with no hematoma, no sensory deficits noted.    No lower extremity edema   Pulmonary/Chest: Effort normal and breath sounds normal. No accessory muscle usage. No tachypnea. No respiratory distress. She has no decreased breath sounds. She has no wheezes. She has no rhonchi. She has no rales. She exhibits no tenderness.   Abdominal: Soft. Bowel sounds are normal. She exhibits no distension. There is no tenderness. There is no rebound and no guarding.   Musculoskeletal: Normal range of motion. She " exhibits no edema.   Neurological: She is alert and oriented to person, place, and time.   Skin: Skin is warm, dry and intact. She is not diaphoretic. No erythema.   Richard skin   Psychiatric: She has a normal mood and affect. Her speech is normal and behavior is normal. Judgment and thought content normal. Cognition and memory are normal.   Nursing note and vitals reviewed.        ECG 12 Lead  Date/Time: 3/6/2020 1:42 PM  Performed by: Jacquie Morton APRN  Authorized by: Jacquie Morton APRN   Comparison: compared with previous ECG   Rhythm: sinus bradycardia  Rate: bradycardic  BPM: 59  Q waves: V1 and V2    T inversion: V6, V5, V4, aVL and V3  QRS axis: normal    Clinical impression: abnormal EKG  Comments: Lateral T wave abnormalities minimally more pronounced  Indication: CAD status post multivessel stenting            Assessment:       Diagnosis Plan   1. Coronary artery disease involving native coronary artery of native heart with angina pectoris (CMS/[facility])     2. S/P drug eluting coronary stent placement     3. Hx of myocardial infarction     4. Ischemic cardiomyopathy     5. Essential hypertension     6. Controlled type 2 diabetes mellitus with other circulatory complication, with long-term current use of insulin (CMS/[facility])     7. Mixed hyperlipidemia     8. Cigarette nicotine dependence without complication     9. Tobacco abuse counseling         Plan:     1.  Coronary artery disease: History of anterior MI with LAD stenting in the past, repeat cath 2008 for recurrent angina showed stable disease with mild in-stent restenosis and she was started on Imdur, for current symptoms she underwent cardiac catheterization 2/28/2020 she had a 90% ostial/proximal in-stent stenosis of the LAD treated with another drug-eluting stent placement she was also found to have ostial stenosis of first diagonal branch and 70% mid to distal stenosis of the LAD treated with drug-eluting stent placement as well as 90% mid RCA  lesion treated with drug-eluting stent placement.  Continue with dual antiplatelet therapy with aspirin and Effient for at least 1 year.  She is on beta-blocker, Imdur and statin as well.  She has not had further angina.  She hAs started cardiac rehab.  Continue current management.  We will nitroglycerin center for emergencies.  She was educated on how to use.    2.  Ischemic cardiomyopathy: EF was found to be reduced at 35% on her stress test 2/25/2020.  We will plan to follow-up with an echocardiogram in about 3 months after recent multiple stenting.  She is on GDMT with metoprolol succinate and lisinopril.    3.  Hypertension: Blood pressure was running high at cardiac rehab.  Blood pressure little elevated in the office today and reports it was 148/70 when she checked it at home with her home monitor.  I am going to increase her lisinopril to a total of 10 mg a day.  She may take 10 mg all at once or 5 mg twice daily.  She will keep monitoring her blood pressure at home.  She is going to take in her home blood pressure cuff to cardiac rehab to verify its accuracy.    4.  Hyperlipidemia: 2/29/2020 labs LDL was good 57, HDL 40, total cholesterol 130, triglycerides elevated 167.  Diet and exercise reviewed. Continue with rosuvastatin 20 mg daily.    5.  Diabetes: Hemoglobin A1c 6.8%.  She is on oral therapy with metoprolol.  Defer to PCP.    6.  Tobacco abuse/nicotine dependence: I have spent over 3 minutes counseling the patient on continued tobacco cessation and the health risks associated with tobacco.  She is using nicotine patches which we will continue.  She will call me if she really feels a strong urge to start again but will continue current plan of care.  I applaud her current efforts.    7.  History of microscopic hematuria: Reports she was told this several years ago and urine today.  Defer to PCP.  She denies maría hematuria.    8.  Snoring/daytime sleepiness: She has symptoms suggestive of sleep  apnea.  She had was wanting to be tested for sleep apnea.  I will place a referral to sleep medicine for an evaluation.  She was educated to call our office if she is not heard about an appointment within 3 weeks.  She demonstrated understanding.    I advised on the importance of good blood pressure, blood sugar and cholesterol control, as well as regular exercise and weight loss and avoidance of tobacco for cardiovascular disease risk factor modification.  Continue cardiac rehab.  She does not feel ready to return to work as she has a physical job with a lot of lifting.  She will remain off work next week as she tries to increase her activity level in cardiac rehab.  She may return to work Monday, 3/16/2020.  She will call me if she does not feel like she is ready to return at that time.    Follow-up with me in 4 weeks.  Repeat echocardiogram in about 3 months.  Follow up with Dr. Mack in 3 months when she returns, unless otherwise needed sooner.  I advised the patient to contact our office with any questions or concerns.       It has been a pleasure to participate in this patient's care. Please feel free to contact me with any questions or concerns.     CHRISTI Jeffrey  03/06/2020             Your medication list           Accurate as of March 6, 2020  1:49 PM. If you have any questions, ask your nurse or doctor.               CONTINUE taking these medications      Instructions Last Dose Given Next Dose Due   ALPRAZolam 0.5 MG tablet  Commonly known as:  XANAX      Take 0.5 mg by mouth 2 (Two) Times a Day As Needed for Anxiety.       aspirin 81 MG chewable tablet      Chew 1 tablet Daily.       baclofen 20 MG tablet  Commonly known as:  LIORESAL      Take 20 mg by mouth 3 (Three) Times a Day.       escitalopram 10 MG tablet  Commonly known as:  LEXAPRO      Take  by mouth every night at bedtime.       gabapentin 600 MG tablet  Commonly known as:  NEURONTIN      Take 600 mg by mouth 2 (Two) Times a Day.        isosorbide mononitrate 30 MG 24 hr tablet  Commonly known as:  IMDUR      Take 30 mg by mouth Daily.       lisinopril 5 MG tablet  Commonly known as:  PRINIVIL,ZESTRIL      Take 5 mg by mouth Daily.       metFORMIN  MG 24 hr tablet  Commonly known as:  GLUCOPHAGE-XR      Take 1,000 mg by mouth Daily With Breakfast.       metoprolol succinate XL 50 MG 24 hr tablet  Commonly known as:  TOPROL-XL      Take  by mouth Daily.       nicotine 21 MG/24HR patch  Commonly known as:  NICODERM CQ      Place 1 patch on the skin as directed by provider Daily.       oxyCODONE-acetaminophen  MG per tablet  Commonly known as:  PERCOCET      Take 1 tablet by mouth Every 6 (Six) Hours As Needed for Moderate Pain .       pantoprazole 40 MG EC tablet  Commonly known as:  PROTONIX      Take 40 mg by mouth 2 (Two) Times a Day.       prasugrel 10 MG tablet  Commonly known as:  EFFIENT      Take 1 tablet by mouth Daily.       promethazine 25 MG tablet  Commonly known as:  PHENERGAN      Take 25 mg by mouth Every 6 (Six) Hours As Needed for Nausea or Vomiting.       rosuvastatin 20 MG tablet  Commonly known as:  CRESTOR      Take 20 mg by mouth Daily.       tiZANidine 4 MG tablet  Commonly known as:  ZANAFLEX      Take  by mouth As Needed.              The above medication changes may not have been made by this provider.  Medication list was updated to reflect medications patient is currently taking including medication changes and discontinuations made by other healthcare providers.     Dictated utilizing Dragon Dictation System.

## 2020-03-09 ENCOUNTER — TREATMENT (OUTPATIENT)
Dept: CARDIAC REHAB | Facility: HOSPITAL | Age: 60
End: 2020-03-09

## 2020-03-09 DIAGNOSIS — Z95.5 S/P DRUG ELUTING CORONARY STENT PLACEMENT: Primary | ICD-10-CM

## 2020-03-09 LAB — GLUCOSE BLDC GLUCOMTR-MCNC: 189 MG/DL (ref 70–130)

## 2020-03-09 PROCEDURE — 82962 GLUCOSE BLOOD TEST: CPT

## 2020-03-09 PROCEDURE — 93798 PHYS/QHP OP CAR RHAB W/ECG: CPT

## 2020-03-11 ENCOUNTER — TREATMENT (OUTPATIENT)
Dept: CARDIAC REHAB | Facility: HOSPITAL | Age: 60
End: 2020-03-11

## 2020-03-11 DIAGNOSIS — Z95.5 S/P DRUG ELUTING CORONARY STENT PLACEMENT: Primary | ICD-10-CM

## 2020-03-11 PROCEDURE — 93798 PHYS/QHP OP CAR RHAB W/ECG: CPT

## 2020-03-12 ENCOUNTER — TELEPHONE (OUTPATIENT)
Dept: CARDIOLOGY | Facility: CLINIC | Age: 60
End: 2020-03-12

## 2020-03-12 NOTE — TELEPHONE ENCOUNTER
Pt left a voice mail wanting to know what here limitations are on lifting before she has to go back to work. candido

## 2020-03-13 ENCOUNTER — TREATMENT (OUTPATIENT)
Dept: CARDIAC REHAB | Facility: HOSPITAL | Age: 60
End: 2020-03-13

## 2020-03-13 DIAGNOSIS — Z95.5 S/P DRUG ELUTING CORONARY STENT PLACEMENT: Primary | ICD-10-CM

## 2020-03-13 PROCEDURE — 93798 PHYS/QHP OP CAR RHAB W/ECG: CPT

## 2020-03-13 NOTE — TELEPHONE ENCOUNTER
Called and spoke with the patient.  Recommended not lifting more than 10 to 20 pounds until she Ihas progressed to cardiac rehab a little bit more.  I also informed her that if what she is lifting is causing her symptoms she needs to back down.  She demonstrated understanding.

## 2020-03-23 ENCOUNTER — TELEPHONE (OUTPATIENT)
Dept: CARDIOLOGY | Facility: CLINIC | Age: 60
End: 2020-03-23

## 2020-03-23 NOTE — TELEPHONE ENCOUNTER
Spoke with patient.  She is agreeable and appropriate for telephone visit.  Schedulers please update her appointment note to telephone visit. Thanks

## 2020-04-02 NOTE — PROGRESS NOTES
Date of Office Visit: 2020  Encounter Provider: CHRISTI Jeffrey  Primary Cardiologist: Dr. Mack  Place of Service: [facility] CARDIOLOGY  Patient Name: Grace Mcclain  :1960      Subjective:     Chief Complaint:  4 week telephone post PCI follow up    History of Present Illness:  Grace Mcclain is a pleasant 59 y.o. female who is known to me. Outside records have been obtained and reviewed by me.     She is a patient of Dr. Mack with a history of acute anterior MI treated with LAD stenting, cardiac catheterization  for recurrent angina with mild in-stent restenosis but nothing significant noted and started on Imdur at that time, diabetes, PE, current every day smoker, chronic neck/back pain, GERD, hypertension, Ménière's.    2020 she presented to see Dr. Mack in the office.  For the last 3 to 4 months she was having central chest pain associated with dyspnea, diaphoresis, lightheadedness after walking 10 or 15 minutes or up multiple flights of stairs that would resolve within a few minutes of rest.  Because of her symptoms nuclear stress test was ordered and performed on 2020 which was abnormal indicating medium sized infarct in the anterior wall and apex as well as the medium sized moderately severe area of ischemia located in the anterior wall and apex as well as a medium sized mildly severe area of ischemia located in the inferior wall she had severe hypokinesis of the anterior wall and an EF noted to be low at 35%.      2020 she was found to have 90% ostial/proximal in-stent restenosis of the LAD treated with eluting stent placement she also was noted to have a discrete ostial stenosis of first diagonal branch and discrete 70% mid to distal stenosis of the LAD treated with drug-eluting stent placement, she also had discrete 90% stenosis of the mid RCA treated with drug-eluting stent placement.  She did well overnight post-cath EKG was stable with  some ST-T wave abnormalities in anterior lateral leads.  She ambulated the unit several times without chest pain, pressure, tightness.  She had mild dyspnea that was better.  Her labs the day of discharge were stable and showed mild stable microcytic anemia with hemoglobin 11.1, normal hematocrit, MCV 78.4., renal function stable HgbA1c 6.8%, lipid panel showed elevated triglycerides 167, LDL good 57, HDL 40, total cholesterol 130.  She was discharged on current medical regimen with Effient added as second antiplatelet agent.  She was educated on risk factor modification including smoking cessation. She was referred to cardiac rehab.  She was educated to follow up with her PCP in a couple weeks who could follow-up on her anemia. I recommended  performing an outpatient echocardiogram due to her ischemic cardiomyopathy.  She was on GDMT with metoprolol succinate as well as lisinopril and her vital signs were stable at discharge.  She was to remain off work until reevaluated at her 1 week office appointment.       3/6/2020 I saw her for a one-week hospital follow-up post PCI.  A few days prior cardiac rehab notified me that her blood pressures running high. I attempted to reach her to try to increase her lisinopril but was unsuccessful in reaching her.  She had several chronic complaints secondary to chronic back and neck pain however denied further chest pain associated with dyspnea, diaphoresis and lightheadedness.  She was able to do the 6-minute walk test at cardiac rehab and felt good without significant shortness of breath with that.  She had occasional palpitations described as a quick flutter that was not new, worsening or associated with other symptoms.  Her intermittent lower extremity edema actually improved and nearly resolved since she had multiple stents placed.  She has Ménière's disease thus has chronic vertigo/dizziness type symptoms but it has not been worse post procedure and there is been no syncope  or near syncope.  She was using a family friend's blood pressure cuff and her blood pressure that morning was 148/70.  She has headaches which she believes is secondary to back and neck pain and actually were improved since heart cath and did not feel her headaches are related to blood pressure.  She was tolerating her new medications appropriately and was not having bleeding issues with dual antiplatelet therapy though reported in the past she was told she had microscopic hematuria on her urine study and saw specialist but denied any maría hematuria.  She denied cough, fever, chills.  She was going to plan to take her blood pressure machine to cardiac rehab to make sure it was accurate.  I increase her lisinopril from 5 to 10 mg daily.  She had symptoms suggestive of sleep apnea so I placed a referral to sleep medicine.  I wanted to plan to repeat an echocardiogram in 3 months after uptitrating guideline directed medical therapy since her EF was noted to be about 35% on her stress test and she did not have a recent echocardiogram.  Fortunately she was using nicotine patches and had not been smoking since she was discharged from the hospital.  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    She is presenting today for a 4-week post PCI follow-up. This patient has consented to a telehealth visit via telephone. The visit was scheduled as a telephone visit to comply with patient safety concerns in accordance with CDC recommendations.  All vitals recorded within this visit are reported by the patient.  I spent 25 minutes in total including but not limited to the 8 minutes spent in direct conversation with this patient.  She is doing pretty well.  She has some fatigue but otherwise no new complaints.  She has not an increase in dizziness or lightheadedness since increasing lisinopril.  She  not had further anginal symptoms.  Fortunately she continues to refrain from smoking and continues to use nicotine patches as cessation aid.  She denies heart failure symptoms.  Unfortunately when she had her home blood pressure machine checked at cardiac rehab it was not giving her accurate readings so she is planning to purchase a new BP machine when she gets paid.    Past Medical History:   Diagnosis Date   • CAD (coronary artery disease)    • Diabetes mellitus (CMS/[facility])    • Essential hypertension    • Ischemic cardiomyopathy    • Mixed hyperlipidemia    • Myocardial infarction (CMS/[facility])    • PE (pulmonary thromboembolism) (CMS/[facility])      Past Surgical History:   Procedure Laterality Date   • BACK SURGERY     • CARDIAC CATHETERIZATION N/A 2/28/2020    Procedure: Left Heart Cath;  Surgeon: Saman Dyer MD;  [facility];  Service: Cardiovascular;  Laterality: N/A;   • CARDIAC CATHETERIZATION N/A 2/28/2020    Procedure: Left ventriculography;  Surgeon: Saman Dyer MD;  [facility];  Service: Cardiovascular;  Laterality: N/A;   • CARDIAC CATHETERIZATION N/A 2/28/2020    Procedure: Stent VIC coronary;  Surgeon: Saman Dyer MD;  [facility];  Service: Cardiovascular;  Laterality: N/A;  rca/lad   • CARDIAC CATHETERIZATION N/A 2/28/2020    Procedure: Coronary angiography;  Surgeon: Saman Dyer MD;  [facility];  Service: Cardiovascular;  Laterality: N/A;   • CARDIAC CATHETERIZATION N/A 2/28/2020    Procedure: Percutaneous Coronary Intervention;  Surgeon: Saman Dyer MD;  [facility];  Service: Cardiovascular;  Laterality: N/A;   • CARDIAC SURGERY     • EYE SURGERY     • HYSTERECTOMY     • NECK SURGERY     • SHOULDER SURGERY Left      Outpatient Medications Prior to Visit   Medication Sig Dispense Refill   • ALPRAZolam (XANAX) 0.5  MG tablet Take 0.5 mg by mouth 2 (Two) Times a Day As Needed for Anxiety.     • aspirin 81 MG chewable tablet Chew 1 tablet Daily. 30 tablet 11   • baclofen (LIORESAL) 20 MG tablet Take 20 mg by mouth 3 (Three) Times a Day.     • escitalopram (LEXAPRO) 10 MG tablet Take  by mouth every night at bedtime.  10   • gabapentin (NEURONTIN) 600 MG tablet Take 600 mg by mouth 2 (Two) Times a Day.     • isosorbide mononitrate (IMDUR) 30 MG 24 hr tablet Take 30 mg by mouth Daily.     • lisinopril (PRINIVIL,ZESTRIL) 5 MG tablet Take 1 tablet by mouth 2 (Two) Times a Day. 60 tablet 2   • metFORMIN ER (GLUCOPHAGE-XR) 500 MG 24 hr tablet Take 1,000 mg by mouth Daily With Breakfast.     • metoprolol succinate XL (TOPROL-XL) 50 MG 24 hr tablet Take  by mouth Daily.     • nicotine (NICODERM CQ) 21 MG/24HR patch Place 1 patch on the skin as directed by provider Daily. 28 each 1   • nitroglycerin (NITROSTAT) 0.4 MG SL tablet 1 under the tongue as needed for angina, may repeat q5mins for up three doses. Call 911 if no relief in chest pain after 1st dose. 25 tablet 11   • oxyCODONE-acetaminophen (PERCOCET)  MG per tablet Take 1 tablet by mouth Every 6 (Six) Hours As Needed for Moderate Pain .     • pantoprazole (PROTONIX) 40 MG EC tablet Take 40 mg by mouth 2 (Two) Times a Day.     • prasugrel (EFFIENT) 10 MG tablet Take 1 tablet by mouth Daily. 30 tablet 11   • promethazine (PHENERGAN) 25 MG tablet Take 25 mg by mouth Every 6 (Six) Hours As Needed for Nausea or Vomiting.     • rosuvastatin (CRESTOR) 20 MG tablet Take 20 mg by mouth Daily.     • tiZANidine (ZANAFLEX) 4 MG tablet Take  by mouth As Needed.  0     No facility-administered medications prior to visit.        Allergies as of 04/03/2020   • (No Known Allergies)     Social History     Socioeconomic History   • Marital status:      Spouse name: Jose Mcclain   • Number of children: 2   • Years of education: 12   • Highest education level: High school graduate    Occupational History   • Occupation: works part time for [employer] in [place]   Tobacco Use   • Smoking status: Former Smoker     Packs/day: 0.50     Years: 36.00     Pack years: 18.00     Types: Cigarettes     Start date:      Last attempt to quit: 2020     Years since quittin.0   • Smokeless tobacco: Never Used   • Tobacco comment: daily caffeine use   Substance and Sexual Activity   • Alcohol use: No   • Drug use: No   • Sexual activity: Defer     History reviewed. No pertinent family history.  Review of Systems   Constitution: Positive for malaise/fatigue. Negative for chills and fever.   HENT: Negative for ear pain, hearing loss, nosebleeds and sore throat.    Eyes: Positive for blurred vision. Negative for double vision, pain, vision loss in left eye and vision loss in right eye.   Cardiovascular: Positive for dyspnea on exertion, leg swelling (intermittent- greatly improved since heart cath) and palpitations (occasional flutter- chronic and unchanged). Negative for chest pain, claudication, irregular heartbeat, near-syncope, orthopnea, paroxysmal nocturnal dyspnea and syncope.   Respiratory: Positive for shortness of breath and snoring. Negative for cough and wheezing.    Endocrine: Positive for cold intolerance and heat intolerance.   Hematologic/Lymphatic: Negative for bleeding problem.   Skin: Negative for color change, itching, rash and unusual hair distribution.   Musculoskeletal: Positive for back pain, joint pain and neck pain. Negative for joint swelling.   Gastrointestinal: Negative for abdominal pain, diarrhea, hematochezia, melena, nausea and vomiting.   Genitourinary: Positive for hematuria (hx of microscopic hematuria). Negative for decreased libido, frequency, hesitancy and incomplete emptying.   Neurological: Positive for excessive daytime sleepiness, dizziness (meniere's disease), headaches, light-headedness, paresthesias and vertigo. Negative for loss of balance,  "numbness and seizures.   Psychiatric/Behavioral: Positive for depression.       Denies changes to her ROS since last visit. No new symptoms and primary complaint is some fatigue.     Objective:     Vitals:    04/03/20 0948   Weight: 70.8 kg (156 lb)   Height: 165.1 cm (65\")     Body mass index is 25.96 kg/m². Unable to check BP and HR at this time.     PHYSICAL EXAM:  Physical Exam     Unable to perform due to telephone encounter.    Procedures Unable to perform 12 lead ECG due to telephone encounter.      Assessment:       Diagnosis Plan   1. Coronary artery disease involving native coronary artery of native heart with angina pectoris (CMS/[facility])     2. S/P drug eluting coronary stent placement     3. Ischemic cardiomyopathy     4. Essential hypertension     5. Mixed hyperlipidemia     6. Controlled type 2 diabetes mellitus with other circulatory complication, with long-term current use of insulin (CMS/[facility])     7. Hx of myocardial infarction     8. Tobacco abuse counseling         Plan:       1.  Coronary artery disease: History of anterior MI with LAD stenting in the past, repeat cath 2008 for recurrent angina showed stable disease with mild in-stent restenosis and she was started on Imdur, for current symptoms she underwent cardiac catheterization 2/28/2020 she had a 90% ostial/proximal in-stent stenosis of the LAD treated with another drug-eluting stent placement she was also found to have ostial stenosis of first diagonal branch and 70% mid to distal stenosis of the LAD treated with drug-eluting stent placement as well as 90% mid RCA lesion treated with drug-eluting stent placement.  Continue with dual antiplatelet therapy with aspirin and Effient for at least 1 year post stent.  She is on beta-blocker, Imdur and statin as well.  She has not had further angina since her receny stenting.  She was participating in cardiac rehab which is now on hold due to Covid 19 pandemic.  Continue current management.  Sublingual " nitroglycerin recently sent in for emergencies but no reported use.    2.  Ischemic cardiomyopathy: EF was found to be reduced at 35% on her stress test 2/25/2020.  She is on GDMT with metoprolol succinate and lisinopril.  Repeat echocardiogram has been ordered to be done prior to next office visit.    3.  Hypertension: Blood pressure was running high  at cardiac rehab so at March 2020 office visit I increase her lisinopril from 5 to 10 mg daily.  She had a blood pressure cuff checked at cardiac rehab and it was an accurate so she is in the process of purchasing a new one when she gets paid.  I told her that her goal blood pressure should be 130/80 or below.  She will call if she is getting readings above that with her home monitor when she purchases.    4.  Hyperlipidemia: 2/29/2020 labs LDL was good 57, HDL 40, total cholesterol 130, triglycerides elevated 167.  Diet and exercise reviewed. Continue with rosuvastatin 20 mg daily.  Recommend repeat labs with PCP.  She will arrange this and make sure we get a copy when she has labs done.    5.  Diabetes: Hemoglobin A1c 6.8%.  She is on oral therapy with metformin.  Defer to PCP.  She demonstrated understanding to follow-up with her PCP on this.    6.  Former tobacco abuse/nicotine dependence: Fortunately she continues to refrain from smoking with the use of nicotine patches.    7.  History of microscopic hematuria: Reports she was told this several years ago.  Defer to PCP.  She denies maría hematuria.    8.  Snoring/daytime sleepiness: She has symptoms suggestive of sleep apnea.  She has been referred to sleep medicine and has an appointment in a few months.  I suspect some of her fatigue may slowly due to an underlying untreated sleep apnea.      She is overall doing well.  Risk factor modification again reiterated.  No medication changes at this time.  She will call when she gets her new blood pressure cuff is her blood pressures are reading elevated.  She was  educated on signs and symptoms for which to call and notify us sooner than her appointment.  She demonstrated understanding of the plan of care.    Follow up with Dr. Mack on 8/14/2020 with repeat echocardiogram (Can be postponed from June to same day as follow up with Dr. Fowler), unless otherwise needed sooner.  I advised the patient to contact our office with any questions or concerns.       It has been a pleasure to participate in this patient's care. Please feel free to contact me with any questions or concerns.     CHRISTI Jeffrey  04/03/2020             Your medication list           Accurate as of April 3, 2020  9:58 AM. If you have any questions, ask your nurse or doctor.               CONTINUE taking these medications      Instructions Last Dose Given Next Dose Due   ALPRAZolam 0.5 MG tablet  Commonly known as:  XANAX      Take 0.5 mg by mouth 2 (Two) Times a Day As Needed for Anxiety.       aspirin 81 MG chewable tablet      Chew 1 tablet Daily.       baclofen 20 MG tablet  Commonly known as:  LIORESAL      Take 20 mg by mouth 3 (Three) Times a Day.       escitalopram 10 MG tablet  Commonly known as:  LEXAPRO      Take  by mouth every night at bedtime.       gabapentin 600 MG tablet  Commonly known as:  NEURONTIN      Take 600 mg by mouth 2 (Two) Times a Day.       isosorbide mononitrate 30 MG 24 hr tablet  Commonly known as:  IMDUR      Take 30 mg by mouth Daily.       lisinopril 5 MG tablet  Commonly known as:  PRINIVIL,ZESTRIL      Take 1 tablet by mouth 2 (Two) Times a Day.       metFORMIN  MG 24 hr tablet  Commonly known as:  GLUCOPHAGE-XR      Take 1,000 mg by mouth Daily With Breakfast.       metoprolol succinate XL 50 MG 24 hr tablet  Commonly known as:  TOPROL-XL      Take  by mouth Daily.       nicotine 21 MG/24HR patch  Commonly known as:  NICODERM CQ      Place 1 patch on the skin as directed by provider Daily.       nitroglycerin 0.4 MG SL tablet  Commonly known as:  NITROSTAT       1 under the tongue as needed for angina, may repeat q5mins for up three doses. Call 911 if no relief in chest pain after 1st dose.       oxyCODONE-acetaminophen  MG per tablet  Commonly known as:  PERCOCET      Take 1 tablet by mouth Every 6 (Six) Hours As Needed for Moderate Pain .       pantoprazole 40 MG EC tablet  Commonly known as:  PROTONIX      Take 40 mg by mouth 2 (Two) Times a Day.       prasugrel 10 MG tablet  Commonly known as:  EFFIENT      Take 1 tablet by mouth Daily.       promethazine 25 MG tablet  Commonly known as:  PHENERGAN      Take 25 mg by mouth Every 6 (Six) Hours As Needed for Nausea or Vomiting.       rosuvastatin 20 MG tablet  Commonly known as:  CRESTOR      Take 20 mg by mouth Daily.       tiZANidine 4 MG tablet  Commonly known as:  ZANAFLEX      Take  by mouth As Needed.              The above medication changes may not have been made by this provider.  Medication list was updated to reflect medications patient is currently taking including medication changes and discontinuations made by other healthcare providers.     Dictated utilizing Dragon Dictation System.

## 2020-04-03 ENCOUNTER — OFFICE VISIT (OUTPATIENT)
Dept: CARDIOLOGY | Facility: CLINIC | Age: 60
End: 2020-04-03

## 2020-04-03 VITALS — WEIGHT: 156 LBS | BODY MASS INDEX: 25.99 KG/M2 | HEIGHT: 65 IN

## 2020-04-03 DIAGNOSIS — I25.2 HX OF MYOCARDIAL INFARCTION: ICD-10-CM

## 2020-04-03 DIAGNOSIS — Z95.5 S/P DRUG ELUTING CORONARY STENT PLACEMENT: ICD-10-CM

## 2020-04-03 DIAGNOSIS — E78.2 MIXED HYPERLIPIDEMIA: ICD-10-CM

## 2020-04-03 DIAGNOSIS — I25.119 CORONARY ARTERY DISEASE INVOLVING NATIVE CORONARY ARTERY OF NATIVE HEART WITH ANGINA PECTORIS (HCC): Primary | ICD-10-CM

## 2020-04-03 DIAGNOSIS — I25.5 ISCHEMIC CARDIOMYOPATHY: ICD-10-CM

## 2020-04-03 DIAGNOSIS — Z79.4 CONTROLLED TYPE 2 DIABETES MELLITUS WITH OTHER CIRCULATORY COMPLICATION, WITH LONG-TERM CURRENT USE OF INSULIN (HCC): ICD-10-CM

## 2020-04-03 DIAGNOSIS — I10 ESSENTIAL HYPERTENSION: ICD-10-CM

## 2020-04-03 DIAGNOSIS — E11.59 CONTROLLED TYPE 2 DIABETES MELLITUS WITH OTHER CIRCULATORY COMPLICATION, WITH LONG-TERM CURRENT USE OF INSULIN (HCC): ICD-10-CM

## 2020-04-03 DIAGNOSIS — Z71.6 TOBACCO ABUSE COUNSELING: ICD-10-CM

## 2020-04-03 PROCEDURE — 99214 OFFICE O/P EST MOD 30 MIN: CPT | Performed by: NURSE PRACTITIONER

## 2020-04-29 RX ORDER — LISINOPRIL 5 MG/1
TABLET ORAL
Qty: 60 TABLET | Refills: 2 | Status: SHIPPED | OUTPATIENT
Start: 2020-04-29 | End: 2020-08-14 | Stop reason: SDUPTHER

## 2020-06-08 ENCOUNTER — HOSPITAL ENCOUNTER (OUTPATIENT)
Dept: CARDIOLOGY | Facility: HOSPITAL | Age: 60
Discharge: HOME | End: 2020-06-08
Admitting: NURSE PRACTITIONER

## 2020-06-08 ENCOUNTER — TELEPHONE (OUTPATIENT)
Dept: CARDIOLOGY | Facility: CLINIC | Age: 60
End: 2020-06-08

## 2020-06-08 VITALS
BODY MASS INDEX: 25.99 KG/M2 | DIASTOLIC BLOOD PRESSURE: 79 MMHG | SYSTOLIC BLOOD PRESSURE: 139 MMHG | HEIGHT: 65 IN | HEART RATE: 79 BPM | WEIGHT: 156 LBS | OXYGEN SATURATION: 94 %

## 2020-06-08 DIAGNOSIS — E78.2 MIXED HYPERLIPIDEMIA: ICD-10-CM

## 2020-06-08 DIAGNOSIS — F17.210 CIGARETTE NICOTINE DEPENDENCE WITHOUT COMPLICATION: ICD-10-CM

## 2020-06-08 DIAGNOSIS — I25.2 HX OF MYOCARDIAL INFARCTION: ICD-10-CM

## 2020-06-08 DIAGNOSIS — E11.59 CONTROLLED TYPE 2 DIABETES MELLITUS WITH OTHER CIRCULATORY COMPLICATION, WITH LONG-TERM CURRENT USE OF INSULIN (HCC): ICD-10-CM

## 2020-06-08 DIAGNOSIS — I25.119 CORONARY ARTERY DISEASE INVOLVING NATIVE CORONARY ARTERY OF NATIVE HEART WITH ANGINA PECTORIS (HCC): ICD-10-CM

## 2020-06-08 DIAGNOSIS — I10 ESSENTIAL HYPERTENSION: ICD-10-CM

## 2020-06-08 DIAGNOSIS — I25.5 ISCHEMIC CARDIOMYOPATHY: ICD-10-CM

## 2020-06-08 DIAGNOSIS — Z79.4 CONTROLLED TYPE 2 DIABETES MELLITUS WITH OTHER CIRCULATORY COMPLICATION, WITH LONG-TERM CURRENT USE OF INSULIN (HCC): ICD-10-CM

## 2020-06-08 DIAGNOSIS — Z95.5 S/P DRUG ELUTING CORONARY STENT PLACEMENT: ICD-10-CM

## 2020-06-08 DIAGNOSIS — Z71.6 TOBACCO ABUSE COUNSELING: ICD-10-CM

## 2020-06-08 LAB
AORTIC ARCH: 2.2 CM
ASCENDING AORTA: 2.7 CM
BH CV ECHO MEAS - ACS: 2.1 CM
BH CV ECHO MEAS - AO MAX PG (FULL): 3.3 MMHG
BH CV ECHO MEAS - AO MAX PG: 6.4 MMHG
BH CV ECHO MEAS - AO MEAN PG (FULL): 2 MMHG
BH CV ECHO MEAS - AO MEAN PG: 4 MMHG
BH CV ECHO MEAS - AO ROOT AREA (BSA CORRECTED): 1.6
BH CV ECHO MEAS - AO ROOT AREA: 6.6 CM^2
BH CV ECHO MEAS - AO ROOT DIAM: 2.9 CM
BH CV ECHO MEAS - AO V2 MAX: 126 CM/SEC
BH CV ECHO MEAS - AO V2 MEAN: 94.4 CM/SEC
BH CV ECHO MEAS - AO V2 VTI: 28.2 CM
BH CV ECHO MEAS - ASC AORTA: 2.7 CM
BH CV ECHO MEAS - AVA(I,A): 1.9 CM^2
BH CV ECHO MEAS - AVA(I,D): 1.9 CM^2
BH CV ECHO MEAS - AVA(V,A): 2 CM^2
BH CV ECHO MEAS - AVA(V,D): 2 CM^2
BH CV ECHO MEAS - BSA(HAYCOCK): 1.8 M^2
BH CV ECHO MEAS - BSA: 1.8 M^2
BH CV ECHO MEAS - BZI_BMI: 26 KILOGRAMS/M^2
BH CV ECHO MEAS - BZI_METRIC_HEIGHT: 165.1 CM
BH CV ECHO MEAS - BZI_METRIC_WEIGHT: 70.8 KG
BH CV ECHO MEAS - EDV(MOD-SP2): 107 ML
BH CV ECHO MEAS - EDV(MOD-SP4): 116 ML
BH CV ECHO MEAS - EDV(TEICH): 147.4 ML
BH CV ECHO MEAS - EF(CUBED): 61.5 %
BH CV ECHO MEAS - EF(MOD-BP): 40 %
BH CV ECHO MEAS - EF(MOD-SP2): 41.1 %
BH CV ECHO MEAS - EF(MOD-SP4): 41.4 %
BH CV ECHO MEAS - EF(TEICH): 52.5 %
BH CV ECHO MEAS - ESV(MOD-SP2): 63 ML
BH CV ECHO MEAS - ESV(MOD-SP4): 68 ML
BH CV ECHO MEAS - ESV(TEICH): 70 ML
BH CV ECHO MEAS - FS: 27.3 %
BH CV ECHO MEAS - IVS/LVPW: 1
BH CV ECHO MEAS - IVSD: 1.1 CM
BH CV ECHO MEAS - LAT PEAK E' VEL: 6 CM/SEC
BH CV ECHO MEAS - LV DIASTOLIC VOL/BSA (35-75): 65.2 ML/M^2
BH CV ECHO MEAS - LV MASS(C)D: 242 GRAMS
BH CV ECHO MEAS - LV MASS(C)DI: 136 GRAMS/M^2
BH CV ECHO MEAS - LV MAX PG: 3 MMHG
BH CV ECHO MEAS - LV MEAN PG: 2 MMHG
BH CV ECHO MEAS - LV SYSTOLIC VOL/BSA (12-30): 38.2 ML/M^2
BH CV ECHO MEAS - LV V1 MAX: 86.7 CM/SEC
BH CV ECHO MEAS - LV V1 MEAN: 61.6 CM/SEC
BH CV ECHO MEAS - LV V1 VTI: 18.7 CM
BH CV ECHO MEAS - LVIDD: 5.5 CM
BH CV ECHO MEAS - LVIDS: 4 CM
BH CV ECHO MEAS - LVLD AP2: 7 CM
BH CV ECHO MEAS - LVLD AP4: 7.1 CM
BH CV ECHO MEAS - LVLS AP2: 6.8 CM
BH CV ECHO MEAS - LVLS AP4: 7.2 CM
BH CV ECHO MEAS - LVOT AREA (M): 2.8 CM^2
BH CV ECHO MEAS - LVOT AREA: 2.8 CM^2
BH CV ECHO MEAS - LVOT DIAM: 1.9 CM
BH CV ECHO MEAS - LVPWD: 1.1 CM
BH CV ECHO MEAS - MED PEAK E' VEL: 6.3 CM/SEC
BH CV ECHO MEAS - MV A DUR: 0.1 SEC
BH CV ECHO MEAS - MV A MAX VEL: 78.8 CM/SEC
BH CV ECHO MEAS - MV DEC SLOPE: 611 CM/SEC^2
BH CV ECHO MEAS - MV DEC TIME: 0.17 SEC
BH CV ECHO MEAS - MV E MAX VEL: 86.4 CM/SEC
BH CV ECHO MEAS - MV E/A: 1.1
BH CV ECHO MEAS - MV MAX PG: 4 MMHG
BH CV ECHO MEAS - MV MEAN PG: 2 MMHG
BH CV ECHO MEAS - MV P1/2T MAX VEL: 95.7 CM/SEC
BH CV ECHO MEAS - MV P1/2T: 45.9 MSEC
BH CV ECHO MEAS - MV V2 MAX: 99.7 CM/SEC
BH CV ECHO MEAS - MV V2 MEAN: 69 CM/SEC
BH CV ECHO MEAS - MV V2 VTI: 28.9 CM
BH CV ECHO MEAS - MVA P1/2T LCG: 2.3 CM^2
BH CV ECHO MEAS - MVA(P1/2T): 4.8 CM^2
BH CV ECHO MEAS - MVA(VTI): 1.8 CM^2
BH CV ECHO MEAS - PA ACC TIME: 0.1 SEC
BH CV ECHO MEAS - PA MAX PG (FULL): 0.57 MMHG
BH CV ECHO MEAS - PA MAX PG: 2.3 MMHG
BH CV ECHO MEAS - PA PR(ACCEL): 36.3 MMHG
BH CV ECHO MEAS - PA V2 MAX: 76 CM/SEC
BH CV ECHO MEAS - PULM A REVS DUR: 0.1 SEC
BH CV ECHO MEAS - PULM A REVS VEL: 25.3 CM/SEC
BH CV ECHO MEAS - PULM DIAS VEL: 42.4 CM/SEC
BH CV ECHO MEAS - PULM S/D: 1.1
BH CV ECHO MEAS - PULM SYS VEL: 46.7 CM/SEC
BH CV ECHO MEAS - PVA(V,A): 2.7 CM^2
BH CV ECHO MEAS - PVA(V,D): 2.7 CM^2
BH CV ECHO MEAS - QP/QS: 0.98
BH CV ECHO MEAS - RAP SYSTOLE: 3 MMHG
BH CV ECHO MEAS - RV BASE (<4.1) - OBSOLETE: 2.7 CM
BH CV ECHO MEAS - RV LENGTH (<8.5) - OBSOLETE: 7.6 CM
BH CV ECHO MEAS - RV MAX PG: 1.7 MMHG
BH CV ECHO MEAS - RV MEAN PG: 1 MMHG
BH CV ECHO MEAS - RV V1 MAX: 65.9 CM/SEC
BH CV ECHO MEAS - RV V1 MEAN: 46.8 CM/SEC
BH CV ECHO MEAS - RV V1 VTI: 16.6 CM
BH CV ECHO MEAS - RVOT AREA: 3.1 CM^2
BH CV ECHO MEAS - RVOT DIAM: 2 CM
BH CV ECHO MEAS - RVSP: 16 MMHG
BH CV ECHO MEAS - SI(AO): 104.7 ML/M^2
BH CV ECHO MEAS - SI(CUBED): 57.5 ML/M^2
BH CV ECHO MEAS - SI(LVOT): 29.8 ML/M^2
BH CV ECHO MEAS - SI(MOD-SP2): 24.7 ML/M^2
BH CV ECHO MEAS - SI(MOD-SP4): 27 ML/M^2
BH CV ECHO MEAS - SI(TEICH): 43.5 ML/M^2
BH CV ECHO MEAS - SUP REN AO DIAM: 2.14 CM
BH CV ECHO MEAS - SV(AO): 186.3 ML
BH CV ECHO MEAS - SV(CUBED): 102.4 ML
BH CV ECHO MEAS - SV(LVOT): 53 ML
BH CV ECHO MEAS - SV(MOD-SP2): 44 ML
BH CV ECHO MEAS - SV(MOD-SP4): 48 ML
BH CV ECHO MEAS - SV(RVOT): 52.2 ML
BH CV ECHO MEAS - SV(TEICH): 77.4 ML
BH CV ECHO MEAS - TAPSE (>1.6): 2.2 CM2
BH CV ECHO MEAS - TR MAX VEL: 182 CM/SEC
BH CV ECHO MEASUREMENTS AVERAGE E/E' RATIO: 14.05
BH CV VAS BP RIGHT ARM: NORMAL MMHG
BH CV XLRA - RV BASE: 2.7 CM
BH CV XLRA - RV LENGTH: 7.6 CM
BH CV XLRA - RV MID: 3 CM
BH CV XLRA - TDI S': 12 CM/SEC
LEFT ATRIUM VOLUME INDEX: 22 ML/M2
MAXIMAL PREDICTED HEART RATE: 163 BPM
SINUS: 2.63 CM
STJ: 2.5 CM
STRESS TARGET HR: 139 BPM

## 2020-06-08 PROCEDURE — 93356 MYOCRD STRAIN IMG SPCKL TRCK: CPT

## 2020-06-08 PROCEDURE — 25010000002 PERFLUTREN (DEFINITY) 8.476 MG IN SODIUM CHLORIDE (PF) 0.9 % 10 ML INJECTION: Performed by: NURSE PRACTITIONER

## 2020-06-08 PROCEDURE — 93306 TTE W/DOPPLER COMPLETE: CPT

## 2020-06-08 PROCEDURE — 93306 TTE W/DOPPLER COMPLETE: CPT | Performed by: INTERNAL MEDICINE

## 2020-06-08 PROCEDURE — 93356 MYOCRD STRAIN IMG SPCKL TRCK: CPT | Performed by: INTERNAL MEDICINE

## 2020-06-08 RX ORDER — METHOCARBAMOL 500 MG/1
TABLET, FILM COATED ORAL
COMMUNITY
Start: 2020-05-12 | End: 2021-05-03 | Stop reason: HOSPADM

## 2020-06-08 RX ADMIN — PERFLUTREN 1.5 ML: 6.52 INJECTION, SUSPENSION INTRAVENOUS at 09:29

## 2020-06-08 NOTE — TELEPHONE ENCOUNTER
Called and spoke with patient.  EF is currently decreased from 40% with abnormal strain with some wall motion abnormalities occluding akinesis of apical anterior, apical septal, apical lateral and apex.  She is on GDM T with lisinopril and metoprolol succinate.  She had stenting of in-stent restenosis of ostial/proximal LAD as well as distal LAD and mid RCA and is on dual antiplatelet therapy.  Unfortunately she has not been monitoring her heart her blood pressure at home as I have previously advised her to do as her blood pressure cuff is broken.  She reports she will get a new one or get hers fixed and start monitoring.  She is gained weight secondary to dietary indiscretion and eating a lot of ice cream over the pandemic.  Goal blood pressure 130s/80s or below she will call if she has low readings less than 100 systolic or her blood pressures higher than her goal.  I discussed with her heart healthy, low-sodium diet, exercise, daily weight monitoring and signs and symptoms of heart failure for which to notify me.  She does have some dependent edema with prolonged standing or sitting then improves with elevation and no other symptoms suggestive of heart failure, worsening dyspnea or chest pain.  She will see Dr. Mack as scheduled 8/14/2020 unless otherwise needed sooner.

## 2020-06-25 ENCOUNTER — OFFICE VISIT (OUTPATIENT)
Dept: SLEEP MEDICINE | Facility: HOSPITAL | Age: 60
End: 2020-06-25

## 2020-06-25 VITALS
HEIGHT: 65 IN | SYSTOLIC BLOOD PRESSURE: 159 MMHG | HEART RATE: 73 BPM | DIASTOLIC BLOOD PRESSURE: 67 MMHG | BODY MASS INDEX: 27.16 KG/M2 | WEIGHT: 163 LBS | OXYGEN SATURATION: 94 %

## 2020-06-25 DIAGNOSIS — E11.59 CONTROLLED TYPE 2 DIABETES MELLITUS WITH OTHER CIRCULATORY COMPLICATION, WITH LONG-TERM CURRENT USE OF INSULIN (HCC): ICD-10-CM

## 2020-06-25 DIAGNOSIS — I25.5 ISCHEMIC CARDIOMYOPATHY: ICD-10-CM

## 2020-06-25 DIAGNOSIS — I25.2 HX OF MYOCARDIAL INFARCTION: ICD-10-CM

## 2020-06-25 DIAGNOSIS — F17.210 CIGARETTE NICOTINE DEPENDENCE WITHOUT COMPLICATION: ICD-10-CM

## 2020-06-25 DIAGNOSIS — G47.10 HYPERSOMNIA WITH SLEEP APNEA: Primary | ICD-10-CM

## 2020-06-25 DIAGNOSIS — I10 ESSENTIAL HYPERTENSION: ICD-10-CM

## 2020-06-25 DIAGNOSIS — G47.30 HYPERSOMNIA WITH SLEEP APNEA: Primary | ICD-10-CM

## 2020-06-25 DIAGNOSIS — Z95.5 S/P DRUG ELUTING CORONARY STENT PLACEMENT: ICD-10-CM

## 2020-06-25 DIAGNOSIS — Z71.6 TOBACCO ABUSE COUNSELING: ICD-10-CM

## 2020-06-25 DIAGNOSIS — E78.2 MIXED HYPERLIPIDEMIA: ICD-10-CM

## 2020-06-25 DIAGNOSIS — I25.119 CORONARY ARTERY DISEASE INVOLVING NATIVE CORONARY ARTERY OF NATIVE HEART WITH ANGINA PECTORIS (HCC): ICD-10-CM

## 2020-06-25 DIAGNOSIS — Z79.4 CONTROLLED TYPE 2 DIABETES MELLITUS WITH OTHER CIRCULATORY COMPLICATION, WITH LONG-TERM CURRENT USE OF INSULIN (HCC): ICD-10-CM

## 2020-06-25 PROCEDURE — 99204 OFFICE O/P NEW MOD 45 MIN: CPT | Performed by: INTERNAL MEDICINE

## 2020-06-25 PROCEDURE — G0463 HOSPITAL OUTPT CLINIC VISIT: HCPCS

## 2020-06-25 NOTE — PROGRESS NOTES
[facility] New Patient[facility]Consultation       Reason for Consultation: LAYTON    Patient Care Team:  Yobani Mccain MD as PCP - General (Family Medicine)  Ector Keita MD as Consulting Physician (Sleep Medicine)    Chief complaint: Snoring and restless and dreaming    History of present illness:    Thank you for asking me to see your patient.  The patient is a 59 y.o. female who describes a 2-year history of snoring, restless, and dreaming every night.  She feels like she is holding her breath.  Patient has coronary artery disease and has several stents placed.    The patient goes to bed between 10:11 PM and awakens between 5 and 7 AM.  Will take a 1 or 2 hours to fall asleep and she is tired and sleepy upon arising.  She states she will take 1 or 2 naps daily.  She has complaints of hypersomnolence and her [place] Sleepiness Scale is abnormal at 21.  She has difficulty driving due to sleepiness and she was counseled.  She has awakened gasping for breath and awakened coughing or choking.  She has morning headaches and she does have a dry mouth in the morning.  She has a restless left upper extremity versus restless legs.  She has nocturnal pain and she sweats excessively during sleep.  She will have some episodes of sleep during the daytime and she has had.  Sure her muscles feel weak.  As stated she has problems falling asleep and difficulty staying asleep.  She will use the restroom 2 or 3 times during the nighttime.  She describes her sleep as restless.  Since last year, she has been talking more in her sleep.  She also acts out her dreams such as hitting and fighting and setting up in bed 2 or 3 times a week.    Review of Systems:    A complete review of systems was done and all were negative with the exception of nasal congestion and postnasal drip, painful joints, chest pain and swollen ankles toward the end of the day, cough, RAINES, dizziness, anxiety and depression, and she always feels too  warm and has excessive thirst    History:  Past Medical History:   Diagnosis Date   • CAD (coronary artery disease)    • Diabetes mellitus (CMS/[facility])    • Essential hypertension    • Ischemic cardiomyopathy    • Mixed hyperlipidemia    • Myocardial infarction (CMS/[facility])    • PE (pulmonary thromboembolism) (CMS/[facility])    ,   Past Surgical History:   Procedure Laterality Date   • BACK SURGERY     • CARDIAC CATHETERIZATION N/A 2020    Procedure: Left Heart Cath;  Surgeon: Saman Dyer MD;  [facility];  Service: Cardiovascular;  Laterality: N/A;   • CARDIAC CATHETERIZATION N/A 2020    Procedure: Left ventriculography;  Surgeon: Saman Dyer MD;  Location:  [facility] CATH INVASIVE LOCATION;  Service: Cardiovascular;  Laterality: N/A;   • CARDIAC CATHETERIZATION N/A 2020    Procedure: Stent VIC coronary;  Surgeon: Saman Dyer MD;  Location:  [facility] CATH INVASIVE LOCATION;  Service: Cardiovascular;  Laterality: N/A;  rca/lad   • CARDIAC CATHETERIZATION N/A 2020    Procedure: Coronary angiography;  Surgeon: Samna Dyer MD;  [facility];  Service: Cardiovascular;  Laterality: N/A;   • CARDIAC CATHETERIZATION N/A 2020    Procedure: Percutaneous Coronary Intervention;  Surgeon: Saman Dyer MD;  [facility];  Service: Cardiovascular;  Laterality: N/A;   • CARDIAC SURGERY     • EYE SURGERY     • HYSTERECTOMY     • NECK SURGERY     • SHOULDER SURGERY Left    ,   Family History   Problem Relation Age of Onset   • Other Mother          in motor vehicle accident .   • Other Father          in motor vehicle accident in .    and   Social History     Tobacco Use   • Smoking status: Current Every Day Smoker     Packs/day: 0.75     Years: 36.00     Pack years: 27.00     Types: Cigarettes     Start date:    • Smokeless tobacco: Never Used   • Tobacco comment: daily  "caffeine use   Substance Use Topics   • Alcohol use: No   • Drug use: No     Social History: She works in food services.  She will have 1 or 2 coffee tea or soda daily.    Allergies:  Patient has no known allergies.     Medication Review: Her list was reviewed.    Vital Signs:    Vitals:    06/25/20 0959   BP: 159/67   Pulse: 73   SpO2: 94%   Weight: 73.9 kg (163 lb)   Height: 165.1 cm (65\")      Body mass index is 27.12 kg/m².  Neck Circumference: 14.5 inches      Physical Exam:    Constitutional:  Well developed 59 y.o. female that appears in no apparent distress.  Awake & oriented times 3.  Normal mood with normal recent and remote memory and normal judgement.  Eyes:  Conjunctivae normal.  Oropharynx: Moist mucous membranes without exudate and a large dry tongue and class III Mallampati airway  Neck: Trachea midline  Respiratory: Effort is not labored  Cardiovascular: Radial pulse regular  Musculoskeletal: Gait appears normal, no digital clubbing evident, no pre-tibial edema    Results Review: Her 14-day sleep log reviewed       Impression:   The patient has complaints of hypersomnolence with symptoms and signs consistent with sleep disordered breathing.  Rule out obstructive sleep apnea.    Plan:  Good sleep hygiene measures should be maintained.  Some weight loss would be beneficial in this patient who is overweight.    Pathophysiology of LAYTON described to the patient.  Cardiovascular complications of untreated LAYTON also reviewed.  I reviewed that obstructive sleep apnea and continued tobacco use are correctable cardiovascular risk factors.    After reviewing all with the patient, I would recommend and she is agreeable to proceed with a home sleep study.  It will be scheduled.    Thank you for requesting me to assist in this patient's care.    Ector Keita MD  Sleep Medicine  06/27/20  16:42          "

## 2020-06-27 PROBLEM — G47.30 HYPERSOMNIA WITH SLEEP APNEA: Status: ACTIVE | Noted: 2020-06-27

## 2020-06-27 PROBLEM — G47.10 HYPERSOMNIA WITH SLEEP APNEA: Status: ACTIVE | Noted: 2020-06-27

## 2020-07-20 ENCOUNTER — HOSPITAL ENCOUNTER (OUTPATIENT)
Dept: SLEEP MEDICINE | Facility: HOSPITAL | Age: 60
Discharge: HOME | End: 2020-07-20
Admitting: INTERNAL MEDICINE

## 2020-07-20 DIAGNOSIS — G47.30 HYPERSOMNIA WITH SLEEP APNEA: ICD-10-CM

## 2020-07-20 DIAGNOSIS — G47.10 HYPERSOMNIA WITH SLEEP APNEA: ICD-10-CM

## 2020-07-20 PROCEDURE — 95806 SLEEP STUDY UNATT&RESP EFFT: CPT | Performed by: INTERNAL MEDICINE

## 2020-07-20 PROCEDURE — 95806 SLEEP STUDY UNATT&RESP EFFT: CPT

## 2020-07-27 ENCOUNTER — TELEPHONE (OUTPATIENT)
Dept: SLEEP MEDICINE | Facility: HOSPITAL | Age: 60
End: 2020-07-27

## 2020-08-14 ENCOUNTER — TELEPHONE (OUTPATIENT)
Dept: CARDIOLOGY | Facility: CLINIC | Age: 60
End: 2020-08-14

## 2020-08-14 ENCOUNTER — TELEMEDICINE (OUTPATIENT)
Dept: CARDIOLOGY | Facility: CLINIC | Age: 60
End: 2020-08-14

## 2020-08-14 VITALS
SYSTOLIC BLOOD PRESSURE: 158 MMHG | WEIGHT: 162 LBS | DIASTOLIC BLOOD PRESSURE: 82 MMHG | HEIGHT: 65 IN | BODY MASS INDEX: 26.99 KG/M2

## 2020-08-14 DIAGNOSIS — I25.119 CORONARY ARTERY DISEASE INVOLVING NATIVE CORONARY ARTERY OF NATIVE HEART WITH ANGINA PECTORIS (HCC): Primary | ICD-10-CM

## 2020-08-14 DIAGNOSIS — I10 ESSENTIAL HYPERTENSION: ICD-10-CM

## 2020-08-14 DIAGNOSIS — I25.5 ISCHEMIC CARDIOMYOPATHY: ICD-10-CM

## 2020-08-14 PROCEDURE — 99442 PR PHYS/QHP TELEPHONE EVALUATION 11-20 MIN: CPT | Performed by: INTERNAL MEDICINE

## 2020-08-14 RX ORDER — LISINOPRIL 10 MG/1
10 TABLET ORAL 2 TIMES DAILY
Qty: 60 TABLET | Refills: 3 | Status: SHIPPED | OUTPATIENT
Start: 2020-08-14 | End: 2020-12-18

## 2020-08-14 RX ORDER — METOPROLOL SUCCINATE 50 MG/1
100 TABLET, EXTENDED RELEASE ORAL DAILY
Start: 2020-08-14 | End: 2020-08-28

## 2020-08-14 NOTE — PROGRESS NOTES
Subjective:     Encounter Date: 20      Patient ID: Grace Mcclain is a 59 y.o. female.    Chief Complaint: CAD, CHF, telehealth due to ongoing pandemic  History of Present Illness     This is a 59-year-old woman with CAD and ischemic cardiomyopathy. She suffered acute anterior MI in .  She was seen and treated by Dr. Wren at [facility] and received a LAD stent at that time.  When I saw her in early  she described recurrent angina, and had an abnormal stress test. C showed severe LAD disease and she received 2 VIC from ostial to proximal and mid to distal LAD, as well as the mid RCA. At that time she was found to have an ischemic CM with EF of 35-40%.     Today she complains of exertional dyspnea, lower extremity edema, left worse than right. She gets short of breath after walking the length of a football field. No dizziness, syncope or palptiations. No burning in the chest, which was her prior anginal symptoms.  Her BP is elevated to about 150/80-90s. She smokes about half a pack day but plans to quit at her birthday.     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      The following portions of the patient's history were reviewed and updated as appropriate: allergies, current medications, past family history, past medical history, past social history, past surgical history and problem list.     REVIEW OF SYSTEMS:   All systems reviewed.  Pertinent positives identified in HPI.  All other systems are negative.    Past Medical History:   Diagnosis Date   • CAD (coronary artery disease)    • Diabetes mellitus (CMS/HCC)    • Essential hypertension    • Ischemic cardiomyopathy    • Mixed hyperlipidemia    • Myocardial infarction (CMS/HCC)    • PE (pulmonary thromboembolism) (CMS/HCC)        Family History   Problem Relation Age of Onset   • Other Mother          in motor vehicle accident .   • Other Father          in motor vehicle accident in .     Social History      Socioeconomic History   • Marital status:      Spouse name: Jose Mcclain   • Number of children: 2   • Years of education: 12   • Highest education level: High school graduate   Occupational History   • Occupation: works part time for [employer] in [place]   Tobacco Use   • Smoking status: Current Every Day Smoker     Packs/day: 0.75     Years: 36.00     Pack years: 27.00     Types: Cigarettes     Start date: 1979   • Smokeless tobacco: Never Used   • Tobacco comment: daily caffeine use   Substance and Sexual Activity   • Alcohol use: No   • Drug use: No   • Sexual activity: Defer     No Known Allergies    Past Surgical History:   Procedure Laterality Date   • BACK SURGERY     • CARDIAC CATHETERIZATION N/A 2/28/2020    Procedure: Left Heart Cath;  Surgeon: Saman Dyer MD;  [facility];  Service: Cardiovascular;  Laterality: N/A;   • CARDIAC CATHETERIZATION N/A 2/28/2020    Procedure: Left ventriculography;  Surgeon: Saman Dyer MD;  [facility];  Service: Cardiovascular;  Laterality: N/A;   • CARDIAC CATHETERIZATION N/A 2/28/2020    Procedure: Stent VIC coronary;  Surgeon: Saman Dyer MD;  [facility];  Service: Cardiovascular;  Laterality: N/A;  rca/lad   • CARDIAC CATHETERIZATION N/A 2/28/2020    Procedure: Coronary angiography;  Surgeon: Saman Dyer MD;  [facility];  Service: Cardiovascular;  Laterality: N/A;   • CARDIAC CATHETERIZATION N/A 2/28/2020    Procedure: Percutaneous Coronary Intervention;  Surgeon: Saman Dyer MD;  Location: [facility] CATH INVASIVE LOCATION;  Service: Cardiovascular;  Laterality: N/A;   • CARDIAC SURGERY     • EYE SURGERY     • HYSTERECTOMY     • NECK SURGERY     • SHOULDER SURGERY Left      Procedures: EKG deferred due to telehealth     Objective:       PHYSICAL EXAM: deferred due to telehealth.       Assessment:          Diagnosis Plan   1. Coronary artery disease involving native coronary artery of native heart with angina pectoris (CMS/HCC)     2. Ischemic cardiomyopathy     3. Essential hypertension            Plan:       1. CAD: S/p LAD stenting in 2002 and repeat PCI in 2020 to the ostial LAD, mid-distal LAD (3.5x38 and 3.5 x15), and mid RCA (4.0x38).  She has no further angina/ chest burning. Can likely stop Imdur at the next visit. Continue ASA and Effient.     2. Ischemic CM: EF 35-40%. Continue Toprol 100mg daily, increase lisinopril to 10mg BID. She will see me in 2 weeks due to LE edema and dyspnea, at that time can decide to add aldactone vs lasix.     3. HTN: Medicaiton adjustment as above; not controlled.     4. HLD: statin     5. Smoking cessation: using nicotine patches and smoking half pack per day. Plans to quit in Septemeber. Has quit successfully in the past.    Hypertension: Continue current medications.    6. Diabetes: On metformin.  On appropriate statin therapy.    This patient has consented to a telehealth visit via phone. The visit was scheduled as a phone visit to comply with patient safety concerns in accordance with CDC recommendations.  All vitals recorded within this visit are reported by the patient.  I spent  25 minutes in total including but not limited to the 15 minutes spent in direct conversation with this patient.     Dr. Mccain, thank you very much for referring this kind patient to me.  Please call with any questions or concerns.  She will follow-up with me in 2 weeks.        Ana Mack MD  08/14/20  [facility]    Outpatient Encounter Medications as of 8/14/2020   Medication Sig Dispense Refill   • ALPRAZolam (XANAX) 0.5 MG tablet Take 0.5 mg by mouth 2 (Two) Times a Day As Needed for Anxiety.     • aspirin 81 MG chewable tablet Chew 1 tablet Daily. 30 tablet 11   • baclofen (LIORESAL) 20 MG tablet Take 20 mg by mouth 4 (Four) Times a Day.     •  escitalopram (LEXAPRO) 10 MG tablet Take  by mouth every night at bedtime.  10   • gabapentin (NEURONTIN) 600 MG tablet Take 600 mg by mouth 2 (Two) Times a Day.     • isosorbide mononitrate (IMDUR) 30 MG 24 hr tablet Take 30 mg by mouth Daily.     • lisinopril (PRINIVIL,ZESTRIL) 10 MG tablet Take 1 tablet by mouth 2 (Two) Times a Day. 60 tablet 3   • metFORMIN (GLUCOPHAGE) 1000 MG tablet Take 1,000 mg by mouth Daily.     • methocarbamol (ROBAXIN) 500 MG tablet      • metoprolol succinate XL (TOPROL-XL) 50 MG 24 hr tablet Take 2 tablets by mouth Daily.     • nicotine (NICODERM CQ) 21 MG/24HR patch Place 1 patch on the skin as directed by provider Daily. 28 each 1   • nitroglycerin (NITROSTAT) 0.4 MG SL tablet 1 under the tongue as needed for angina, may repeat q5mins for up three doses. Call 911 if no relief in chest pain after 1st dose. 25 tablet 11   • oxyCODONE-acetaminophen (PERCOCET)  MG per tablet Take 1 tablet by mouth Every 6 (Six) Hours As Needed for Moderate Pain .     • pantoprazole (PROTONIX) 40 MG EC tablet Take 40 mg by mouth 2 (Two) Times a Day.     • prasugrel (EFFIENT) 10 MG tablet Take 1 tablet by mouth Daily. 30 tablet 11   • promethazine (PHENERGAN) 25 MG tablet Take 25 mg by mouth Every 6 (Six) Hours As Needed for Nausea or Vomiting.     • rosuvastatin (CRESTOR) 20 MG tablet Take 20 mg by mouth Daily.     • tiZANidine (ZANAFLEX) 4 MG tablet Take  by mouth As Needed.  0   • [DISCONTINUED] lisinopril (PRINIVIL,ZESTRIL) 5 MG tablet TAKE 1 TABLET BY MOUTH TWICE DAILY 60 tablet 2   • [DISCONTINUED] metoprolol succinate XL (TOPROL-XL) 50 MG 24 hr tablet Take  by mouth 2 (two) times a day.     • [DISCONTINUED] metFORMIN ER (GLUCOPHAGE-XR) 500 MG 24 hr tablet Take 1,000 mg by mouth Daily With Breakfast.       No facility-administered encounter medications on file as of 8/14/2020.

## 2020-08-28 ENCOUNTER — OFFICE VISIT (OUTPATIENT)
Dept: CARDIOLOGY | Facility: CLINIC | Age: 60
End: 2020-08-28

## 2020-08-28 VITALS
HEIGHT: 65 IN | SYSTOLIC BLOOD PRESSURE: 142 MMHG | WEIGHT: 170 LBS | BODY MASS INDEX: 28.32 KG/M2 | DIASTOLIC BLOOD PRESSURE: 80 MMHG | HEART RATE: 66 BPM

## 2020-08-28 DIAGNOSIS — I25.118 CORONARY ARTERY DISEASE OF NATIVE ARTERY OF NATIVE HEART WITH STABLE ANGINA PECTORIS (HCC): Primary | ICD-10-CM

## 2020-08-28 DIAGNOSIS — I50.21 ACUTE SYSTOLIC CONGESTIVE HEART FAILURE (HCC): ICD-10-CM

## 2020-08-28 PROCEDURE — 99214 OFFICE O/P EST MOD 30 MIN: CPT | Performed by: INTERNAL MEDICINE

## 2020-08-28 PROCEDURE — 93000 ELECTROCARDIOGRAM COMPLETE: CPT | Performed by: INTERNAL MEDICINE

## 2020-08-28 RX ORDER — METOPROLOL SUCCINATE 100 MG/1
50 TABLET, EXTENDED RELEASE ORAL DAILY
COMMUNITY
Start: 2020-08-13

## 2020-08-28 RX ORDER — SPIRONOLACTONE 25 MG/1
25 TABLET ORAL DAILY
Qty: 90 TABLET | Refills: 3 | Status: SHIPPED | OUTPATIENT
Start: 2020-08-28 | End: 2021-05-10

## 2020-08-28 NOTE — PROGRESS NOTES
Subjective:     Encounter Date: 20      Patient ID: Grace Mcclain is a 59 y.o. female.    Chief Complaint: CAD, CHF, telehealth due to ongoing pandemic  Coronary Artery Disease          This is a 59-year-old woman with CAD and ischemic cardiomyopathy. She suffered acute anterior MI in .  She was seen and treated by Dr. Wren at [facility] and received a LAD stent at that time.  When I saw her in early  she described recurrent angina, and had an abnormal stress test. LHC showed severe LAD disease and she received 2 VIC from ostial to proximal and mid to distal LAD, as well as the mid RCA. At that time she was found to have an ischemic CM with EF of 35-40%.     She notes orthopnea, LE Edema and dyspnea. She took a few HCTZ pills and diuresed 4lbs with improved symptoms. She also has substernal burning when walking at work, which is new for her. No angina, pressure or claudication. No dizziness or palpitations.     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      The following portions of the patient's history were reviewed and updated as appropriate: allergies, current medications, past family history, past medical history, past social history, past surgical history and problem list.     REVIEW OF SYSTEMS:   All systems reviewed.  Pertinent positives identified in HPI.  All other systems are negative.    Past Medical History:   Diagnosis Date   • CAD (coronary artery disease)    • Diabetes mellitus (CMS/HCC)    • Essential hypertension    • Ischemic cardiomyopathy    • Mixed hyperlipidemia    • Myocardial infarction (CMS/HCC)    • PE (pulmonary thromboembolism) (CMS/HCC)        Family History   Problem Relation Age of Onset   • Other Mother          in motor vehicle accident .   • Other Father          in motor vehicle accident in .     Social History     Socioeconomic History   • Marital status:      Spouse name: Jose Mcclain   • Number of children: 2   •  Years of education: 12   • Highest education level: High school graduate   Occupational History   • Occupation: works part time for [employer] in [place]   Tobacco Use   • Smoking status: Current Every Day Smoker     Packs/day: 0.75     Years: 36.00     Pack years: 27.00     Types: Cigarettes     Start date: 1979   • Smokeless tobacco: Never Used   • Tobacco comment: daily caffeine use   Substance and Sexual Activity   • Alcohol use: No   • Drug use: No   • Sexual activity: Defer     No Known Allergies    Past Surgical History:   Procedure Laterality Date   • BACK SURGERY     • CARDIAC CATHETERIZATION N/A 2/28/2020    Procedure: Left Heart Cath;  Surgeon: Saman Dyer MD;  [facility];  Service: Cardiovascular;  Laterality: N/A;   • CARDIAC CATHETERIZATION N/A 2/28/2020    Procedure: Left ventriculography;  Surgeon: Saman Dyer MD;  [facility];  Service: Cardiovascular;  Laterality: N/A;   • CARDIAC CATHETERIZATION N/A 2/28/2020    Procedure: Stent VIC coronary;  Surgeon: Saman Dyer MD;  Location: [facility] CATH INVASIVE LOCATION;  Service: Cardiovascular;  Laterality: N/A;  rca/lad   • CARDIAC CATHETERIZATION N/A 2/28/2020    Procedure: Coronary angiography;  Surgeon: Saman Dyer MD;  [facility];  Service: Cardiovascular;  Laterality: N/A;   • CARDIAC CATHETERIZATION N/A 2/28/2020    Procedure: Percutaneous Coronary Intervention;  Surgeon: Saman Dyer MD;  Location: [facility] CATH INVASIVE LOCATION;  Service: Cardiovascular;  Laterality: N/A;   • CARDIAC SURGERY     • EYE SURGERY     • HYSTERECTOMY     • NECK SURGERY     • SHOULDER SURGERY Left        ECG 12 Lead  Date/Time: 8/28/2020 3:46 PM  Performed by: Ana Mack MD  Authorized by: Ana Mack MD   Comparison: compared with previous ECG from 3/6/2020  Similar to previous ECG  Rhythm: sinus rhythm  Rate:  normal  Q waves: V1 and V2    ST Elevation: V1 and V2  T inversion: V3, V4, V5, V6 and aVL  QRS axis: normal    Clinical impression: abnormal EKG             Objective:       GEN: no distress, alert and oriented  Eyes: normal sclerae, normal lids and lashes  HENT: moist mucous membranes,   Lungs: CTAB, no rales or wheezes  Chest: no abnormalities  Neck: no JVD or carotid bruits  CV: RRR, no murmurs, +2 DP and 2+ carotid pulses b/l  Abdomen: soft, nontender, nondistended  Extremities: no edema  Skin: no rash, warm, dry  Heme/Lymph: no bruising  Psych: organized thought, normal behavior and affect       Assessment:          Diagnosis Plan   1. Coronary artery disease of native artery of native heart with stable angina pectoris (CMS/[facility])  Basic Metabolic Panel   2. Acute systolic congestive heart failure (CMS/[facility])            Plan:       1. CAD: S/p LAD stenting in 2002 and repeat PCI in 2020 to the ostial LAD, mid-distal LAD (3.5x38 and 3.5 x15), and mid RCA (4.0x38). ASA and Effient.   She complains today of chest burning with exertion. Continue Toprol and Imdur. If she continues to have angina after diuresis, we will repeat her [facility] vs nuclear stress test.     2. Ischemic CM: EF 35-40%. Continue Toprol 100mg daily, Lisinopril 10 BID. Add spironolactone 25 daily. Get BMP in one week and record weights.      3. HTN: Improved BP    4. HLD: statin     5. Smoking cessation: Plans to quit tomorrow.     6. Diabetes: On metformin.      7. HLD: On appropriate statin therapy.    Dr. Mccain, thank you very much for referring this kind patient to me.  Please call with any questions or concerns.  She will follow-up with me in 4 weeks.        Ana Mack MD  08/28/20  [facility]    Outpatient Encounter Medications as of 8/28/2020   Medication Sig Dispense Refill   • ALPRAZolam (XANAX) 0.5 MG tablet Take 0.5 mg by mouth 2 (Two) Times a Day As Needed for Anxiety.     • aspirin 81 MG chewable tablet Chew 1 tablet  Daily. 30 tablet 11   • baclofen (LIORESAL) 20 MG tablet Take 20 mg by mouth 4 (Four) Times a Day.     • escitalopram (LEXAPRO) 10 MG tablet Take  by mouth every night at bedtime.  10   • gabapentin (NEURONTIN) 600 MG tablet Take 600 mg by mouth 2 (Two) Times a Day.     • isosorbide mononitrate (IMDUR) 30 MG 24 hr tablet Take 30 mg by mouth Daily.     • lisinopril (PRINIVIL,ZESTRIL) 10 MG tablet Take 1 tablet by mouth 2 (Two) Times a Day. 60 tablet 3   • metFORMIN (GLUCOPHAGE) 1000 MG tablet Take 1,000 mg by mouth Daily.     • methocarbamol (ROBAXIN) 500 MG tablet      • metoprolol succinate XL (TOPROL-XL) 100 MG 24 hr tablet Take  by mouth Daily.     • nicotine (NICODERM CQ) 21 MG/24HR patch Place 1 patch on the skin as directed by provider Daily. 28 each 1   • nitroglycerin (NITROSTAT) 0.4 MG SL tablet 1 under the tongue as needed for angina, may repeat q5mins for up three doses. Call 911 if no relief in chest pain after 1st dose. 25 tablet 11   • oxyCODONE-acetaminophen (PERCOCET)  MG per tablet Take 1 tablet by mouth Every 6 (Six) Hours As Needed for Moderate Pain .     • pantoprazole (PROTONIX) 40 MG EC tablet Take 40 mg by mouth 2 (Two) Times a Day.     • prasugrel (EFFIENT) 10 MG tablet Take 1 tablet by mouth Daily. 30 tablet 11   • promethazine (PHENERGAN) 25 MG tablet Take 25 mg by mouth Every 6 (Six) Hours As Needed for Nausea or Vomiting.     • rosuvastatin (CRESTOR) 20 MG tablet Take 20 mg by mouth Daily.     • tiZANidine (ZANAFLEX) 4 MG tablet Take  by mouth As Needed.  0   • spironolactone (ALDACTONE) 25 MG tablet Take 1 tablet by mouth Daily. 90 tablet 3   • [DISCONTINUED] metoprolol succinate XL (TOPROL-XL) 50 MG 24 hr tablet Take 2 tablets by mouth Daily.       No facility-administered encounter medications on file as of 8/28/2020.

## 2020-09-01 RX ORDER — LISINOPRIL 5 MG/1
TABLET ORAL
Qty: 60 TABLET | Refills: 2 | OUTPATIENT
Start: 2020-09-01

## 2020-10-07 ENCOUNTER — APPOINTMENT (OUTPATIENT)
Dept: SLEEP MEDICINE | Facility: HOSPITAL | Age: 60
End: 2020-10-07

## 2020-10-15 ENCOUNTER — OFFICE VISIT (OUTPATIENT)
Dept: SLEEP MEDICINE | Facility: HOSPITAL | Age: 60
End: 2020-10-15

## 2020-10-15 VITALS — BODY MASS INDEX: 27.16 KG/M2 | HEIGHT: 66 IN | WEIGHT: 169 LBS

## 2020-10-15 DIAGNOSIS — G47.14 HYPERSOMNIA DUE TO MEDICAL CONDITION: ICD-10-CM

## 2020-10-15 DIAGNOSIS — G47.33 OSA ON CPAP: Primary | ICD-10-CM

## 2020-10-15 DIAGNOSIS — Z99.89 OSA ON CPAP: Primary | ICD-10-CM

## 2020-10-15 PROCEDURE — 99213 OFFICE O/P EST LOW 20 MIN: CPT | Performed by: INTERNAL MEDICINE

## 2020-10-15 PROCEDURE — G0463 HOSPITAL OUTPT CLINIC VISIT: HCPCS

## 2020-10-15 NOTE — PROGRESS NOTES
"[facility] Up [facility] Note     Chief Complaint:  LAYTON     Primary Care Physician: Yobani Mccain MD    Interval History:   The patient is a 60 y.o. female who I initially saw 6/25/2020.  Home sleep study performed 7/20/2020.  Moderate LAYTON with AHI 15.2 events per hour noted no sleep-related hypoxia.  Auto CPAP initiated.  The patient states she is unchanged.  She is having difficulty using auto CPAP due to the interface.  She goes to bed between 11 PM and midnight and awakens between 5:30 AM and 6 AM.  She will use the restroom during the nighttime.  She also turns over.  Dryden Sleepiness Scale is abnormal at 22.    Review of Systems:    A complete review of systems was done and all were negative with the exception of nasal congestion and postnasal drip    Social History:    Social History     Socioeconomic History   • Marital status:      Spouse name: Jose Mcclain   • Number of children: 2   • Years of education: 12   • Highest education level: High school graduate   Occupational History   • Occupation: works part time for [employer] in [employer]   Tobacco Use   • Smoking status: Current Every Day Smoker     Packs/day: 0.75     Years: 36.00     Pack years: 27.00     Types: Cigarettes     Start date: 1979   • Smokeless tobacco: Never Used   • Tobacco comment: daily caffeine use   Substance and Sexual Activity   • Alcohol use: No   • Drug use: No   • Sexual activity: Defer       Allergies:  Patient has no known allergies.     Medication Review:  Reviewed.      Vital Signs:    Vitals:    10/15/20 0900   Weight: 76.7 kg (169 lb)   Height: 167.6 cm (66\")     Body mass index is 27.28 kg/m².    Physical Exam:    Constitutional:  Well developed 60 y.o. female that appears in no apparent distress.  Awake & oriented times 3.  Normal mood with normal recent and remote memory and normal judgement.  Eyes:  Conjunctivae normal.  Oropharynx: Previously, moist mucous membranes without exudate and a " large tongue and class III Mallampati airway, patient is wearing a facemask.     Results Review:  DME is moves and she uses a nasal mask.  Downloads between 8/12 and 9/23/2020 compliance 70%.  Average usage is 3 hours and 37 minutes.  Average AHI is 28 with mild abnormal obstructive and partial obstructive events and elevated clear airway index of 14.7.  No significant leak.  Average AutoCPAP pressure is 10.7 and her auto CPAP is 6-16.    Impression:   Moderate obstructive sleep apnea by home sleep study 7/20/2020 nearly adequately treated with auto CPAP with good compliance and usage and persistent complaints of hypersomnolence.  The patient has had the emergence of central sleep apnea    Plan:  Good sleep hygiene measures should be maintained.  Some weight loss would be beneficial in this patient who is overweight by BMI.  The patient is benefiting from the treatment being provided.     I reviewed all with the patient.  She describes more problems with the interface?  She wishes to try DreamWear nasal pillows, medium headgear and medium pillows.    The patient will call for any problems and will follow up in 6-8 weeks.      Ector Keita MD  Sleep Medicine  10/15/20  11:00 EDT

## 2020-10-19 PROBLEM — G47.33 OSA ON CPAP: Status: ACTIVE | Noted: 2020-07-20

## 2020-10-19 PROBLEM — Z99.89 OSA ON CPAP: Status: ACTIVE | Noted: 2020-07-20

## 2020-10-19 PROBLEM — G47.14 HYPERSOMNIA DUE TO MEDICAL CONDITION: Status: ACTIVE | Noted: 2020-10-19

## 2020-12-17 ENCOUNTER — APPOINTMENT (OUTPATIENT)
Dept: SLEEP MEDICINE | Facility: HOSPITAL | Age: 60
End: 2020-12-17

## 2020-12-18 RX ORDER — LISINOPRIL 10 MG/1
TABLET ORAL
Qty: 60 TABLET | Refills: 3 | Status: SHIPPED | OUTPATIENT
Start: 2020-12-18 | End: 2021-04-05

## 2021-04-05 RX ORDER — LISINOPRIL 10 MG/1
TABLET ORAL
Qty: 60 TABLET | Refills: 0 | Status: SHIPPED | OUTPATIENT
Start: 2021-04-05 | End: 2021-11-15

## 2021-04-30 ENCOUNTER — HOSPITAL ENCOUNTER (INPATIENT)
Facility: HOSPITAL | Age: 61
LOS: 3 days | Discharge: HOME | End: 2021-05-03
Attending: INTERNAL MEDICINE | Admitting: INTERNAL MEDICINE

## 2021-04-30 PROBLEM — J12.82 PNEUMONIA DUE TO COVID-19 VIRUS: Status: ACTIVE | Noted: 2021-04-30

## 2021-04-30 PROBLEM — U07.1 PNEUMONIA DUE TO COVID-19 VIRUS: Status: ACTIVE | Noted: 2021-04-30

## 2021-04-30 LAB
ARTERIAL PATENCY WRIST A: POSITIVE
ATMOSPHERIC PRESS: 753 MMHG
BASE EXCESS BLDA CALC-SCNC: -5.2 MMOL/L (ref 0–2)
BDY SITE: ABNORMAL
D DIMER PPP FEU-MCNC: 1.06 MCGFEU/ML (ref 0–0.49)
D-LACTATE SERPL-SCNC: 1.9 MMOL/L (ref 0.5–2)
FERRITIN SERPL-MCNC: 109 NG/ML (ref 13–150)
GAS FLOW AIRWAY: 2 LPM
HCO3 BLDA-SCNC: 20 MMOL/L (ref 22–28)
LDH SERPL-CCNC: 304 U/L (ref 135–214)
MODALITY: ABNORMAL
PCO2 BLDA: 36.6 MM HG (ref 35–45)
PH BLDA: 7.34 PH UNITS (ref 7.35–7.45)
PO2 BLDA: 81.4 MM HG (ref 80–100)
PROCALCITONIN SERPL-MCNC: 0.08 NG/ML (ref 0–0.25)
SAO2 % BLDCOA: 95.4 % (ref 92–99)
TOTAL RATE: 22 BREATHS/MINUTE
TROPONIN T SERPL-MCNC: <0.01 NG/ML (ref 0–0.03)

## 2021-04-30 PROCEDURE — 82728 ASSAY OF FERRITIN: CPT | Performed by: INTERNAL MEDICINE

## 2021-04-30 PROCEDURE — 85379 FIBRIN DEGRADATION QUANT: CPT | Performed by: INTERNAL MEDICINE

## 2021-04-30 PROCEDURE — 36600 WITHDRAWAL OF ARTERIAL BLOOD: CPT

## 2021-04-30 PROCEDURE — 25010000002 ENOXAPARIN PER 10 MG: Performed by: INTERNAL MEDICINE

## 2021-04-30 PROCEDURE — 84145 PROCALCITONIN (PCT): CPT | Performed by: INTERNAL MEDICINE

## 2021-04-30 PROCEDURE — 82803 BLOOD GASES ANY COMBINATION: CPT

## 2021-04-30 PROCEDURE — 25010000002 NALOXONE PER 1 MG: Performed by: INTERNAL MEDICINE

## 2021-04-30 PROCEDURE — 25010000002 LORAZEPAM PER 2 MG: Performed by: INTERNAL MEDICINE

## 2021-04-30 PROCEDURE — 83605 ASSAY OF LACTIC ACID: CPT | Performed by: INTERNAL MEDICINE

## 2021-04-30 PROCEDURE — 84484 ASSAY OF TROPONIN QUANT: CPT | Performed by: INTERNAL MEDICINE

## 2021-04-30 PROCEDURE — 25010000002 ONDANSETRON PER 1 MG: Performed by: INTERNAL MEDICINE

## 2021-04-30 PROCEDURE — 83615 LACTATE (LD) (LDH) ENZYME: CPT | Performed by: INTERNAL MEDICINE

## 2021-04-30 RX ORDER — ASPIRIN 81 MG/1
81 TABLET, CHEWABLE ORAL DAILY
Status: DISCONTINUED | OUTPATIENT
Start: 2021-05-01 | End: 2021-05-03 | Stop reason: HOSPADM

## 2021-04-30 RX ORDER — UREA 10 %
3 LOTION (ML) TOPICAL NIGHTLY PRN
Status: DISCONTINUED | OUTPATIENT
Start: 2021-04-30 | End: 2021-05-03 | Stop reason: HOSPADM

## 2021-04-30 RX ORDER — ISOSORBIDE MONONITRATE 30 MG/1
30 TABLET, EXTENDED RELEASE ORAL DAILY
Status: DISCONTINUED | OUTPATIENT
Start: 2021-05-01 | End: 2021-05-03 | Stop reason: HOSPADM

## 2021-04-30 RX ORDER — ONDANSETRON 2 MG/ML
4 INJECTION INTRAMUSCULAR; INTRAVENOUS EVERY 6 HOURS PRN
Status: DISCONTINUED | OUTPATIENT
Start: 2021-04-30 | End: 2021-05-03 | Stop reason: HOSPADM

## 2021-04-30 RX ORDER — NALOXONE HYDROCHLORIDE 0.4 MG/ML
0.4 INJECTION, SOLUTION INTRAMUSCULAR; INTRAVENOUS; SUBCUTANEOUS
Status: COMPLETED | OUTPATIENT
Start: 2021-04-30 | End: 2021-04-30

## 2021-04-30 RX ORDER — SODIUM CHLORIDE, SODIUM LACTATE, POTASSIUM CHLORIDE, CALCIUM CHLORIDE 600; 310; 30; 20 MG/100ML; MG/100ML; MG/100ML; MG/100ML
50 INJECTION, SOLUTION INTRAVENOUS CONTINUOUS
Status: DISCONTINUED | OUTPATIENT
Start: 2021-04-30 | End: 2021-05-02

## 2021-04-30 RX ORDER — NITROGLYCERIN 0.4 MG/1
0.4 TABLET SUBLINGUAL
Status: DISCONTINUED | OUTPATIENT
Start: 2021-04-30 | End: 2021-05-03 | Stop reason: HOSPADM

## 2021-04-30 RX ORDER — ROSUVASTATIN CALCIUM 20 MG/1
20 TABLET, COATED ORAL DAILY
Status: DISCONTINUED | OUTPATIENT
Start: 2021-05-01 | End: 2021-05-03 | Stop reason: HOSPADM

## 2021-04-30 RX ORDER — ONDANSETRON 4 MG/1
4 TABLET, FILM COATED ORAL EVERY 6 HOURS PRN
Status: DISCONTINUED | OUTPATIENT
Start: 2021-04-30 | End: 2021-05-03 | Stop reason: HOSPADM

## 2021-04-30 RX ORDER — NALOXONE HCL 0.4 MG/ML
0.4 VIAL (ML) INJECTION ONCE
Status: DISCONTINUED | OUTPATIENT
Start: 2021-04-30 | End: 2021-05-02

## 2021-04-30 RX ORDER — LORAZEPAM 2 MG/ML
0.5 INJECTION INTRAMUSCULAR ONCE
Status: COMPLETED | OUTPATIENT
Start: 2021-04-30 | End: 2021-04-30

## 2021-04-30 RX ORDER — ACETAMINOPHEN 325 MG/1
650 TABLET ORAL EVERY 4 HOURS PRN
Status: DISCONTINUED | OUTPATIENT
Start: 2021-04-30 | End: 2021-05-03 | Stop reason: HOSPADM

## 2021-04-30 RX ORDER — DEXAMETHASONE SODIUM PHOSPHATE 10 MG/ML
6 INJECTION, SOLUTION INTRAMUSCULAR; INTRAVENOUS DAILY
Status: DISCONTINUED | OUTPATIENT
Start: 2021-05-01 | End: 2021-05-01

## 2021-04-30 RX ADMIN — SODIUM CHLORIDE, POTASSIUM CHLORIDE, SODIUM LACTATE AND CALCIUM CHLORIDE 100 ML/HR: 600; 310; 30; 20 INJECTION, SOLUTION INTRAVENOUS at 23:09

## 2021-04-30 RX ADMIN — LORAZEPAM 0.5 MG: 2 INJECTION INTRAMUSCULAR; INTRAVENOUS at 23:10

## 2021-04-30 RX ADMIN — NALOXONE HYDROCHLORIDE 0.4 MG: 0.4 INJECTION, SOLUTION INTRAMUSCULAR; INTRAVENOUS; SUBCUTANEOUS at 21:59

## 2021-04-30 RX ADMIN — SODIUM CHLORIDE 1000 ML: 9 INJECTION, SOLUTION INTRAVENOUS at 21:59

## 2021-04-30 RX ADMIN — ENOXAPARIN SODIUM 40 MG: 40 INJECTION SUBCUTANEOUS at 21:59

## 2021-04-30 RX ADMIN — ONDANSETRON 4 MG: 2 INJECTION INTRAMUSCULAR; INTRAVENOUS at 22:05

## 2021-05-01 ENCOUNTER — APPOINTMENT (OUTPATIENT)
Dept: GENERAL RADIOLOGY | Facility: HOSPITAL | Age: 61
End: 2021-05-01

## 2021-05-01 PROBLEM — D69.6 THROMBOCYTOPENIA (HCC): Status: ACTIVE | Noted: 2021-05-01

## 2021-05-01 PROBLEM — J12.82 PNEUMONIA DUE TO COVID-19 VIRUS: Status: RESOLVED | Noted: 2021-04-30 | Resolved: 2021-05-01

## 2021-05-01 PROBLEM — Z79.899 POLYPHARMACY: Status: ACTIVE | Noted: 2021-05-01

## 2021-05-01 PROBLEM — U07.1 PNEUMONIA DUE TO COVID-19 VIRUS: Status: RESOLVED | Noted: 2021-04-30 | Resolved: 2021-05-01

## 2021-05-01 LAB
ALBUMIN SERPL-MCNC: 3.5 G/DL (ref 3.5–5.2)
ALBUMIN/GLOB SERPL: 1.3 G/DL
ALP SERPL-CCNC: 73 U/L (ref 39–117)
ALT SERPL W P-5'-P-CCNC: 10 U/L (ref 1–33)
ANION GAP SERPL CALCULATED.3IONS-SCNC: 11.1 MMOL/L (ref 5–15)
AST SERPL-CCNC: 21 U/L (ref 1–32)
BILIRUB SERPL-MCNC: 0.2 MG/DL (ref 0–1.2)
BILIRUB UR QL STRIP: NEGATIVE
BUN SERPL-MCNC: 25 MG/DL (ref 6–20)
BUN/CREAT SERPL: 24.8 (ref 7–25)
CALCIUM SPEC-SCNC: 7.9 MG/DL (ref 8.6–10.5)
CHLORIDE SERPL-SCNC: 111 MMOL/L (ref 98–107)
CK SERPL-CCNC: 137 U/L (ref 20–180)
CLARITY UR: CLEAR
CO2 SERPL-SCNC: 20.9 MMOL/L (ref 22–29)
COLOR UR: YELLOW
CREAT SERPL-MCNC: 1.01 MG/DL (ref 0.57–1)
CRP SERPL-MCNC: 2.51 MG/DL (ref 0–0.5)
D DIMER PPP FEU-MCNC: 0.97 MCGFEU/ML (ref 0–0.49)
DEPRECATED RDW RBC AUTO: 51.7 FL (ref 37–54)
ERYTHROCYTE [DISTWIDTH] IN BLOOD BY AUTOMATED COUNT: 18.5 % (ref 12.3–15.4)
FERRITIN SERPL-MCNC: 101 NG/ML (ref 13–150)
FIBRINOGEN PPP-MCNC: 474 MG/DL (ref 219–464)
GFR SERPL CREATININE-BSD FRML MDRD: 56 ML/MIN/1.73
GLOBULIN UR ELPH-MCNC: 2.7 GM/DL
GLUCOSE BLDC GLUCOMTR-MCNC: 120 MG/DL (ref 70–130)
GLUCOSE SERPL-MCNC: 101 MG/DL (ref 65–99)
GLUCOSE UR STRIP-MCNC: NEGATIVE MG/DL
HCT VFR BLD AUTO: 35.2 % (ref 34–46.6)
HGB BLD-MCNC: 11.5 G/DL (ref 12–15.9)
HGB UR QL STRIP.AUTO: NEGATIVE
KETONES UR QL STRIP: ABNORMAL
LDH SERPL-CCNC: 322 U/L (ref 135–214)
LEUKOCYTE ESTERASE UR QL STRIP.AUTO: NEGATIVE
LYMPHOCYTES # BLD MANUAL: 0.71 10*3/MM3 (ref 0.7–3.1)
LYMPHOCYTES NFR BLD MANUAL: 15.3 % (ref 19.6–45.3)
LYMPHOCYTES NFR BLD MANUAL: 7.1 % (ref 5–12)
MCH RBC QN AUTO: 25.8 PG (ref 26.6–33)
MCHC RBC AUTO-ENTMCNC: 32.7 G/DL (ref 31.5–35.7)
MCV RBC AUTO: 79.1 FL (ref 79–97)
MONOCYTES # BLD AUTO: 0.33 10*3/MM3 (ref 0.1–0.9)
NEUTROPHILS # BLD AUTO: 3.62 10*3/MM3 (ref 1.7–7)
NEUTROPHILS NFR BLD MANUAL: 77.6 % (ref 42.7–76)
NITRITE UR QL STRIP: NEGATIVE
PH UR STRIP.AUTO: <=5 [PH] (ref 5–8)
PLAT MORPH BLD: NORMAL
PLATELET # BLD AUTO: 89 10*3/MM3 (ref 140–450)
PMV BLD AUTO: 11 FL (ref 6–12)
POIKILOCYTOSIS BLD QL SMEAR: ABNORMAL
POTASSIUM SERPL-SCNC: 4.4 MMOL/L (ref 3.5–5.2)
PROT SERPL-MCNC: 6.2 G/DL (ref 6–8.5)
PROT UR QL STRIP: NEGATIVE
RBC # BLD AUTO: 4.45 10*6/MM3 (ref 3.77–5.28)
SODIUM SERPL-SCNC: 143 MMOL/L (ref 136–145)
SP GR UR STRIP: 1.01 (ref 1–1.03)
UROBILINOGEN UR QL STRIP: ABNORMAL
WBC # BLD AUTO: 4.67 10*3/MM3 (ref 3.4–10.8)
WBC MORPH BLD: NORMAL

## 2021-05-01 PROCEDURE — 63710000001 DEXAMETHASONE PER 0.25 MG: Performed by: INTERNAL MEDICINE

## 2021-05-01 PROCEDURE — 25010000002 ENOXAPARIN PER 10 MG: Performed by: INTERNAL MEDICINE

## 2021-05-01 PROCEDURE — 85007 BL SMEAR W/DIFF WBC COUNT: CPT | Performed by: INTERNAL MEDICINE

## 2021-05-01 PROCEDURE — 81003 URINALYSIS AUTO W/O SCOPE: CPT | Performed by: INTERNAL MEDICINE

## 2021-05-01 PROCEDURE — 82550 ASSAY OF CK (CPK): CPT | Performed by: INTERNAL MEDICINE

## 2021-05-01 PROCEDURE — 85384 FIBRINOGEN ACTIVITY: CPT | Performed by: INTERNAL MEDICINE

## 2021-05-01 PROCEDURE — 82962 GLUCOSE BLOOD TEST: CPT

## 2021-05-01 PROCEDURE — 85379 FIBRIN DEGRADATION QUANT: CPT | Performed by: INTERNAL MEDICINE

## 2021-05-01 PROCEDURE — 71045 X-RAY EXAM CHEST 1 VIEW: CPT

## 2021-05-01 PROCEDURE — 82728 ASSAY OF FERRITIN: CPT | Performed by: INTERNAL MEDICINE

## 2021-05-01 PROCEDURE — 25010000002 DEXAMETHASONE SODIUM PHOSPHATE 10 MG/ML SOLUTION: Performed by: INTERNAL MEDICINE

## 2021-05-01 PROCEDURE — 85025 COMPLETE CBC W/AUTO DIFF WBC: CPT | Performed by: INTERNAL MEDICINE

## 2021-05-01 PROCEDURE — 86140 C-REACTIVE PROTEIN: CPT | Performed by: INTERNAL MEDICINE

## 2021-05-01 PROCEDURE — 83615 LACTATE (LD) (LDH) ENZYME: CPT | Performed by: INTERNAL MEDICINE

## 2021-05-01 PROCEDURE — 97162 PT EVAL MOD COMPLEX 30 MIN: CPT | Performed by: PHYSICAL THERAPIST

## 2021-05-01 PROCEDURE — 80053 COMPREHEN METABOLIC PANEL: CPT | Performed by: INTERNAL MEDICINE

## 2021-05-01 RX ORDER — OXYCODONE HYDROCHLORIDE AND ACETAMINOPHEN 5; 325 MG/1; MG/1
1 TABLET ORAL EVERY 8 HOURS PRN
Status: DISCONTINUED | OUTPATIENT
Start: 2021-05-01 | End: 2021-05-01

## 2021-05-01 RX ORDER — PRASUGREL 10 MG/1
10 TABLET, FILM COATED ORAL DAILY
Status: DISCONTINUED | OUTPATIENT
Start: 2021-05-01 | End: 2021-05-03 | Stop reason: HOSPADM

## 2021-05-01 RX ORDER — PANTOPRAZOLE SODIUM 40 MG/1
40 TABLET, DELAYED RELEASE ORAL
Status: DISCONTINUED | OUTPATIENT
Start: 2021-05-01 | End: 2021-05-03 | Stop reason: HOSPADM

## 2021-05-01 RX ORDER — ALPRAZOLAM 0.25 MG/1
0.25 TABLET ORAL 2 TIMES DAILY PRN
Status: DISCONTINUED | OUTPATIENT
Start: 2021-05-01 | End: 2021-05-03 | Stop reason: HOSPADM

## 2021-05-01 RX ORDER — ESCITALOPRAM OXALATE 10 MG/1
10 TABLET ORAL NIGHTLY
Status: DISCONTINUED | OUTPATIENT
Start: 2021-05-01 | End: 2021-05-03 | Stop reason: HOSPADM

## 2021-05-01 RX ORDER — NICOTINE 21 MG/24HR
1 PATCH, TRANSDERMAL 24 HOURS TRANSDERMAL EVERY 24 HOURS
Status: DISCONTINUED | OUTPATIENT
Start: 2021-05-01 | End: 2021-05-03 | Stop reason: HOSPADM

## 2021-05-01 RX ORDER — OLANZAPINE 10 MG/1
5 INJECTION, POWDER, LYOPHILIZED, FOR SOLUTION INTRAMUSCULAR EVERY 8 HOURS PRN
Status: DISCONTINUED | OUTPATIENT
Start: 2021-05-01 | End: 2021-05-03 | Stop reason: HOSPADM

## 2021-05-01 RX ORDER — GABAPENTIN 300 MG/1
600 CAPSULE ORAL EVERY 12 HOURS SCHEDULED
Status: DISCONTINUED | OUTPATIENT
Start: 2021-05-01 | End: 2021-05-01

## 2021-05-01 RX ADMIN — ESCITALOPRAM 10 MG: 10 TABLET, FILM COATED ORAL at 19:39

## 2021-05-01 RX ADMIN — ALPRAZOLAM 0.25 MG: 0.25 TABLET ORAL at 14:25

## 2021-05-01 RX ADMIN — PRASUGREL 10 MG: 10 TABLET, FILM COATED ORAL at 15:51

## 2021-05-01 RX ADMIN — ROSUVASTATIN CALCIUM 20 MG: 20 TABLET, FILM COATED ORAL at 08:21

## 2021-05-01 RX ADMIN — NICOTINE 1 PATCH: 21 PATCH, EXTENDED RELEASE TRANSDERMAL at 15:51

## 2021-05-01 RX ADMIN — ISOSORBIDE MONONITRATE 30 MG: 30 TABLET ORAL at 08:21

## 2021-05-01 RX ADMIN — Medication 3 MG: at 23:43

## 2021-05-01 RX ADMIN — ACETAMINOPHEN 650 MG: 325 TABLET, FILM COATED ORAL at 23:44

## 2021-05-01 RX ADMIN — SODIUM CHLORIDE, POTASSIUM CHLORIDE, SODIUM LACTATE AND CALCIUM CHLORIDE 100 ML/HR: 600; 310; 30; 20 INJECTION, SOLUTION INTRAVENOUS at 10:56

## 2021-05-01 RX ADMIN — ASPIRIN 81 MG: 81 TABLET, CHEWABLE ORAL at 08:21

## 2021-05-01 RX ADMIN — DEXAMETHASONE SODIUM PHOSPHATE 6 MG: 10 INJECTION, SOLUTION INTRAMUSCULAR; INTRAVENOUS at 08:21

## 2021-05-01 RX ADMIN — ENOXAPARIN SODIUM 40 MG: 40 INJECTION SUBCUTANEOUS at 19:39

## 2021-05-01 RX ADMIN — OLANZAPINE 5 MG: 10 INJECTION, POWDER, LYOPHILIZED, FOR SOLUTION INTRAMUSCULAR at 20:20

## 2021-05-01 RX ADMIN — DEXAMETHASONE 6 MG: 4 TABLET ORAL at 17:45

## 2021-05-02 LAB
ALBUMIN SERPL-MCNC: 3.9 G/DL (ref 3.5–5.2)
ALBUMIN/GLOB SERPL: 1.4 G/DL
ALP SERPL-CCNC: 80 U/L (ref 39–117)
ALT SERPL W P-5'-P-CCNC: 11 U/L (ref 1–33)
ANION GAP SERPL CALCULATED.3IONS-SCNC: 13.4 MMOL/L (ref 5–15)
AST SERPL-CCNC: 22 U/L (ref 1–32)
BILIRUB SERPL-MCNC: 0.4 MG/DL (ref 0–1.2)
BUN SERPL-MCNC: 13 MG/DL (ref 6–20)
BUN/CREAT SERPL: 18.3 (ref 7–25)
CALCIUM SPEC-SCNC: 9 MG/DL (ref 8.6–10.5)
CHLORIDE SERPL-SCNC: 105 MMOL/L (ref 98–107)
CK SERPL-CCNC: 225 U/L (ref 20–180)
CO2 SERPL-SCNC: 19.6 MMOL/L (ref 22–29)
CREAT SERPL-MCNC: 0.71 MG/DL (ref 0.57–1)
CRP SERPL-MCNC: 1.81 MG/DL (ref 0–0.5)
DEPRECATED RDW RBC AUTO: 53.7 FL (ref 37–54)
ERYTHROCYTE [DISTWIDTH] IN BLOOD BY AUTOMATED COUNT: 19.4 % (ref 12.3–15.4)
FERRITIN SERPL-MCNC: 94.1 NG/ML (ref 13–150)
GFR SERPL CREATININE-BSD FRML MDRD: 85 ML/MIN/1.73
GLOBULIN UR ELPH-MCNC: 2.8 GM/DL
GLUCOSE SERPL-MCNC: 150 MG/DL (ref 65–99)
HCT VFR BLD AUTO: 38.6 % (ref 34–46.6)
HGB BLD-MCNC: 12.5 G/DL (ref 12–15.9)
LYMPHOCYTES # BLD MANUAL: 0.58 10*3/MM3 (ref 0.7–3.1)
LYMPHOCYTES NFR BLD MANUAL: 15 % (ref 19.6–45.3)
LYMPHOCYTES NFR BLD MANUAL: 5 % (ref 5–12)
MCH RBC QN AUTO: 25.8 PG (ref 26.6–33)
MCHC RBC AUTO-ENTMCNC: 32.4 G/DL (ref 31.5–35.7)
MCV RBC AUTO: 79.6 FL (ref 79–97)
MONOCYTES # BLD AUTO: 0.19 10*3/MM3 (ref 0.1–0.9)
NEUTROPHILS # BLD AUTO: 3.09 10*3/MM3 (ref 1.7–7)
NEUTROPHILS NFR BLD MANUAL: 80 % (ref 42.7–76)
PLAT MORPH BLD: NORMAL
PLATELET # BLD AUTO: 139 10*3/MM3 (ref 140–450)
PMV BLD AUTO: 10.8 FL (ref 6–12)
POIKILOCYTOSIS BLD QL SMEAR: ABNORMAL
POTASSIUM SERPL-SCNC: 3.9 MMOL/L (ref 3.5–5.2)
PROT SERPL-MCNC: 6.7 G/DL (ref 6–8.5)
RBC # BLD AUTO: 4.85 10*6/MM3 (ref 3.77–5.28)
SODIUM SERPL-SCNC: 138 MMOL/L (ref 136–145)
WBC # BLD AUTO: 3.86 10*3/MM3 (ref 3.4–10.8)
WBC MORPH BLD: NORMAL

## 2021-05-02 PROCEDURE — 82728 ASSAY OF FERRITIN: CPT | Performed by: INTERNAL MEDICINE

## 2021-05-02 PROCEDURE — 97116 GAIT TRAINING THERAPY: CPT | Performed by: PHYSICAL THERAPIST

## 2021-05-02 PROCEDURE — 63710000001 DEXAMETHASONE PER 0.25 MG: Performed by: INTERNAL MEDICINE

## 2021-05-02 PROCEDURE — 63710000001 ONDANSETRON PER 8 MG: Performed by: INTERNAL MEDICINE

## 2021-05-02 PROCEDURE — 82550 ASSAY OF CK (CPK): CPT | Performed by: INTERNAL MEDICINE

## 2021-05-02 PROCEDURE — 85025 COMPLETE CBC W/AUTO DIFF WBC: CPT | Performed by: INTERNAL MEDICINE

## 2021-05-02 PROCEDURE — 85007 BL SMEAR W/DIFF WBC COUNT: CPT | Performed by: INTERNAL MEDICINE

## 2021-05-02 PROCEDURE — 93010 ELECTROCARDIOGRAM REPORT: CPT | Performed by: INTERNAL MEDICINE

## 2021-05-02 PROCEDURE — 25010000002 ENOXAPARIN PER 10 MG: Performed by: INTERNAL MEDICINE

## 2021-05-02 PROCEDURE — 93005 ELECTROCARDIOGRAM TRACING: CPT | Performed by: INTERNAL MEDICINE

## 2021-05-02 PROCEDURE — 86140 C-REACTIVE PROTEIN: CPT | Performed by: INTERNAL MEDICINE

## 2021-05-02 PROCEDURE — 80053 COMPREHEN METABOLIC PANEL: CPT | Performed by: INTERNAL MEDICINE

## 2021-05-02 RX ORDER — METOPROLOL SUCCINATE 50 MG/1
50 TABLET, EXTENDED RELEASE ORAL
Status: DISCONTINUED | OUTPATIENT
Start: 2021-05-02 | End: 2021-05-03 | Stop reason: HOSPADM

## 2021-05-02 RX ORDER — LISINOPRIL 10 MG/1
10 TABLET ORAL
Status: DISCONTINUED | OUTPATIENT
Start: 2021-05-02 | End: 2021-05-03 | Stop reason: HOSPADM

## 2021-05-02 RX ORDER — METOPROLOL SUCCINATE 25 MG/1
25 TABLET, EXTENDED RELEASE ORAL
Status: DISCONTINUED | OUTPATIENT
Start: 2021-05-02 | End: 2021-05-02

## 2021-05-02 RX ADMIN — ACETAMINOPHEN 650 MG: 325 TABLET, FILM COATED ORAL at 09:23

## 2021-05-02 RX ADMIN — NICOTINE 1 PATCH: 21 PATCH, EXTENDED RELEASE TRANSDERMAL at 12:13

## 2021-05-02 RX ADMIN — PRASUGREL 10 MG: 10 TABLET, FILM COATED ORAL at 09:23

## 2021-05-02 RX ADMIN — LISINOPRIL 10 MG: 10 TABLET ORAL at 12:11

## 2021-05-02 RX ADMIN — ALPRAZOLAM 0.25 MG: 0.25 TABLET ORAL at 16:02

## 2021-05-02 RX ADMIN — ENOXAPARIN SODIUM 40 MG: 40 INJECTION SUBCUTANEOUS at 22:10

## 2021-05-02 RX ADMIN — PANTOPRAZOLE SODIUM 40 MG: 40 TABLET, DELAYED RELEASE ORAL at 17:06

## 2021-05-02 RX ADMIN — DEXAMETHASONE 6 MG: 4 TABLET ORAL at 09:23

## 2021-05-02 RX ADMIN — ROSUVASTATIN CALCIUM 20 MG: 20 TABLET, FILM COATED ORAL at 09:22

## 2021-05-02 RX ADMIN — ONDANSETRON HYDROCHLORIDE 4 MG: 4 TABLET, FILM COATED ORAL at 09:23

## 2021-05-02 RX ADMIN — ACETAMINOPHEN 650 MG: 325 TABLET, FILM COATED ORAL at 16:02

## 2021-05-02 RX ADMIN — ISOSORBIDE MONONITRATE 30 MG: 30 TABLET ORAL at 09:23

## 2021-05-02 RX ADMIN — SODIUM CHLORIDE, POTASSIUM CHLORIDE, SODIUM LACTATE AND CALCIUM CHLORIDE 100 ML/HR: 600; 310; 30; 20 INJECTION, SOLUTION INTRAVENOUS at 06:38

## 2021-05-02 RX ADMIN — ALPRAZOLAM 0.25 MG: 0.25 TABLET ORAL at 02:12

## 2021-05-02 RX ADMIN — Medication 3 MG: at 22:10

## 2021-05-02 RX ADMIN — ASPIRIN 81 MG: 81 TABLET, CHEWABLE ORAL at 09:23

## 2021-05-02 RX ADMIN — PANTOPRAZOLE SODIUM 40 MG: 40 TABLET, DELAYED RELEASE ORAL at 06:38

## 2021-05-02 RX ADMIN — ALPRAZOLAM 0.25 MG: 0.25 TABLET ORAL at 22:10

## 2021-05-02 RX ADMIN — ESCITALOPRAM 10 MG: 10 TABLET, FILM COATED ORAL at 22:10

## 2021-05-02 RX ADMIN — METOPROLOL SUCCINATE 50 MG: 50 TABLET, EXTENDED RELEASE ORAL at 12:12

## 2021-05-03 VITALS
TEMPERATURE: 97.9 F | OXYGEN SATURATION: 96 % | SYSTOLIC BLOOD PRESSURE: 141 MMHG | WEIGHT: 160.9 LBS | RESPIRATION RATE: 18 BRPM | HEIGHT: 65 IN | BODY MASS INDEX: 26.81 KG/M2 | HEART RATE: 90 BPM | DIASTOLIC BLOOD PRESSURE: 84 MMHG

## 2021-05-03 LAB
ALBUMIN SERPL-MCNC: 3.7 G/DL (ref 3.5–5.2)
ALBUMIN/GLOB SERPL: 1.2 G/DL
ALP SERPL-CCNC: 82 U/L (ref 39–117)
ALT SERPL W P-5'-P-CCNC: 14 U/L (ref 1–33)
ANION GAP SERPL CALCULATED.3IONS-SCNC: 11.6 MMOL/L (ref 5–15)
AST SERPL-CCNC: 20 U/L (ref 1–32)
BASOPHILS # BLD AUTO: 0.01 10*3/MM3 (ref 0–0.2)
BASOPHILS NFR BLD AUTO: 0.1 % (ref 0–1.5)
BILIRUB SERPL-MCNC: 0.4 MG/DL (ref 0–1.2)
BUN SERPL-MCNC: 10 MG/DL (ref 6–20)
BUN/CREAT SERPL: 13 (ref 7–25)
CALCIUM SPEC-SCNC: 9.3 MG/DL (ref 8.6–10.5)
CHLORIDE SERPL-SCNC: 105 MMOL/L (ref 98–107)
CK SERPL-CCNC: 81 U/L (ref 20–180)
CO2 SERPL-SCNC: 22.4 MMOL/L (ref 22–29)
CREAT SERPL-MCNC: 0.77 MG/DL (ref 0.57–1)
CRP SERPL-MCNC: 0.83 MG/DL (ref 0–0.5)
D DIMER PPP FEU-MCNC: 0.86 MCGFEU/ML (ref 0–0.49)
DEPRECATED RDW RBC AUTO: 52.2 FL (ref 37–54)
EOSINOPHIL # BLD AUTO: 0 10*3/MM3 (ref 0–0.4)
EOSINOPHIL NFR BLD AUTO: 0 % (ref 0.3–6.2)
ERYTHROCYTE [DISTWIDTH] IN BLOOD BY AUTOMATED COUNT: 19.2 % (ref 12.3–15.4)
FERRITIN SERPL-MCNC: 83.8 NG/ML (ref 13–150)
FIBRINOGEN PPP-MCNC: 442 MG/DL (ref 219–464)
GFR SERPL CREATININE-BSD FRML MDRD: 77 ML/MIN/1.73
GLOBULIN UR ELPH-MCNC: 3 GM/DL
GLUCOSE SERPL-MCNC: 150 MG/DL (ref 65–99)
HCT VFR BLD AUTO: 37 % (ref 34–46.6)
HGB BLD-MCNC: 11.9 G/DL (ref 12–15.9)
IMM GRANULOCYTES # BLD AUTO: 0.04 10*3/MM3 (ref 0–0.05)
IMM GRANULOCYTES NFR BLD AUTO: 0.5 % (ref 0–0.5)
LDH SERPL-CCNC: 286 U/L (ref 135–214)
LYMPHOCYTES # BLD AUTO: 1.23 10*3/MM3 (ref 0.7–3.1)
LYMPHOCYTES NFR BLD AUTO: 16.8 % (ref 19.6–45.3)
MCH RBC QN AUTO: 25.1 PG (ref 26.6–33)
MCHC RBC AUTO-ENTMCNC: 32.2 G/DL (ref 31.5–35.7)
MCV RBC AUTO: 77.9 FL (ref 79–97)
MONOCYTES # BLD AUTO: 0.79 10*3/MM3 (ref 0.1–0.9)
MONOCYTES NFR BLD AUTO: 10.8 % (ref 5–12)
NEUTROPHILS NFR BLD AUTO: 5.23 10*3/MM3 (ref 1.7–7)
NEUTROPHILS NFR BLD AUTO: 71.8 % (ref 42.7–76)
NRBC BLD AUTO-RTO: 0 /100 WBC (ref 0–0.2)
PLATELET # BLD AUTO: 167 10*3/MM3 (ref 140–450)
PMV BLD AUTO: 10.3 FL (ref 6–12)
POTASSIUM SERPL-SCNC: 3.8 MMOL/L (ref 3.5–5.2)
PROT SERPL-MCNC: 6.7 G/DL (ref 6–8.5)
QT INTERVAL: 345 MS
RBC # BLD AUTO: 4.75 10*6/MM3 (ref 3.77–5.28)
SODIUM SERPL-SCNC: 139 MMOL/L (ref 136–145)
WBC # BLD AUTO: 7.3 10*3/MM3 (ref 3.4–10.8)

## 2021-05-03 PROCEDURE — 85025 COMPLETE CBC W/AUTO DIFF WBC: CPT | Performed by: INTERNAL MEDICINE

## 2021-05-03 PROCEDURE — 63710000001 DEXAMETHASONE PER 0.25 MG: Performed by: INTERNAL MEDICINE

## 2021-05-03 PROCEDURE — 85379 FIBRIN DEGRADATION QUANT: CPT | Performed by: INTERNAL MEDICINE

## 2021-05-03 PROCEDURE — 85384 FIBRINOGEN ACTIVITY: CPT | Performed by: INTERNAL MEDICINE

## 2021-05-03 PROCEDURE — 82728 ASSAY OF FERRITIN: CPT | Performed by: INTERNAL MEDICINE

## 2021-05-03 PROCEDURE — 80053 COMPREHEN METABOLIC PANEL: CPT | Performed by: INTERNAL MEDICINE

## 2021-05-03 PROCEDURE — 82550 ASSAY OF CK (CPK): CPT | Performed by: INTERNAL MEDICINE

## 2021-05-03 PROCEDURE — 86140 C-REACTIVE PROTEIN: CPT | Performed by: INTERNAL MEDICINE

## 2021-05-03 PROCEDURE — 83615 LACTATE (LD) (LDH) ENZYME: CPT | Performed by: INTERNAL MEDICINE

## 2021-05-03 RX ORDER — ALPRAZOLAM 0.5 MG/1
0.25 TABLET ORAL 2 TIMES DAILY PRN
Start: 2021-05-03

## 2021-05-03 RX ORDER — OXYCODONE AND ACETAMINOPHEN 10; 325 MG/1; MG/1
1 TABLET ORAL EVERY 12 HOURS PRN
Start: 2021-05-03

## 2021-05-03 RX ADMIN — DEXAMETHASONE 6 MG: 4 TABLET ORAL at 08:18

## 2021-05-03 RX ADMIN — ISOSORBIDE MONONITRATE 30 MG: 30 TABLET ORAL at 08:19

## 2021-05-03 RX ADMIN — METOPROLOL SUCCINATE 50 MG: 50 TABLET, EXTENDED RELEASE ORAL at 08:19

## 2021-05-03 RX ADMIN — PANTOPRAZOLE SODIUM 40 MG: 40 TABLET, DELAYED RELEASE ORAL at 06:49

## 2021-05-03 RX ADMIN — ASPIRIN 81 MG: 81 TABLET, CHEWABLE ORAL at 08:18

## 2021-05-03 RX ADMIN — PRASUGREL 10 MG: 10 TABLET, FILM COATED ORAL at 08:18

## 2021-05-03 RX ADMIN — ROSUVASTATIN CALCIUM 20 MG: 20 TABLET, FILM COATED ORAL at 08:19

## 2021-05-03 RX ADMIN — LISINOPRIL 10 MG: 10 TABLET ORAL at 08:19

## 2021-05-04 ENCOUNTER — READMISSION MANAGEMENT (OUTPATIENT)
Dept: CALL CENTER | Facility: HOSPITAL | Age: 61
End: 2021-05-04

## 2021-05-04 NOTE — OUTREACH NOTE
Prep Survey      Responses   [facility] patient discharged from?  [place]   Is LACE score < 7 ?  No   Emergency Room discharge w/ pulse ox?  No   Eligibility  Readm Mgmt   Discharge diagnosis  COVID-19 virus   Does the patient have one of the following disease processes/diagnoses(primary or secondary)?  COVID-19   Does the patient have Home health ordered?  No   Is there a DME ordered?  No   Prep survey completed?  Yes          Lilly Ch RN

## 2021-05-04 NOTE — OUTREACH NOTE
COVID-19 Week 1 Survey      Responses   [facility] patient discharged from?  [place]   Does the patient have one of the following disease processes/diagnoses(primary or secondary)?  COVID-19   COVID-19 underlying condition?  None   Call Number  Call 1   Week 1 Call successful?  Yes   Call start time  1305   Call end time  1314   Discharge diagnosis  COVID-19 virus   Meds reviewed with patient/caregiver?  Yes   Is the patient having any side effects they believe may be caused by any medication additions or changes?  No   Does the patient have all medications ordered at discharge?  Yes   Is the patient taking all medications as directed (includes completed medication regime)?  Yes   Does the patient have a primary care provider?   Yes   Does the patient have an appointment with their PCP or specialist within 7 days of discharge?  No   What is preventing the patient from scheduling follow up appointments within 7 days of discharge?  Haven't had time   Nursing Interventions  Advised patient to make appointment, Educated patient on importance of making appointment   Has the patient kept scheduled appointments due by today?  N/A   Has home health visited the patient within 72 hours of discharge?  N/A   Psychosocial issues?  No   Did the patient receive a copy of their discharge instructions?  Yes   Did the patient receive a copy of COVID-19 specific instructions?  Yes   Nursing interventions  Reviewed instructions with patient   What is the patient's perception of their health status since discharge?  Improving   Does the patient have any of the following symptoms?  Cough   Nursing Interventions  Nurse provided patient education   Pulse Ox monitoring  None   Is the patient/caregiver able to teach back steps to recovery at home?  Set small, achievable goals for return to baseline health, Rest and rebuild strength, gradually increase activity, Eat a well-balance diet, Practice good oral hygiene   If the patient is  a current smoker, are they able to teach back resources for cessation?  -- [has not smoked for 2 months]   Is the patient/caregiver able to teach back the hierarchy of who to call/visit for symptoms/problems? PCP, Specialist, Home health nurse, Urgent Care, ED, 911  Yes   COVID-19 call completed?  Yes          Henna Brothers RN

## 2021-05-05 ENCOUNTER — READMISSION MANAGEMENT (OUTPATIENT)
Dept: CALL CENTER | Facility: HOSPITAL | Age: 61
End: 2021-05-05

## 2021-05-05 NOTE — OUTREACH NOTE
COVID-19 Week 1 Survey      Responses   [facility] patient discharged from?  [place]   Does the patient have one of the following disease processes/diagnoses(primary or secondary)?  COVID-19   COVID-19 underlying condition?  None   Call Number  Call 2   Week 1 Call successful?  Yes   Call start time  1429   Call end time  1435   Is patient permission given to speak with other caregiver?  Yes   Person spoke with today (if not patient) and relationship  Barbara-daughter   Meds reviewed with patient/caregiver?  Yes   Is the patient having any side effects they believe may be caused by any medication additions or changes?  No   Does the patient have all medications ordered at discharge?  Yes   Is the patient taking all medications as directed (includes completed medication regime)?  Yes   Comments regarding appointments  PCP appt 05/07/21 in office-quarantine complete 05/02/21.   Does the patient have a primary care provider?   Yes   Comments regarding PCP  Moving to new PCP closer to her home-scheduled new PCP appt 05/07/21.   Has the patient kept scheduled appointments due by today?  N/A   Has home health visited the patient within 72 hours of discharge?  N/A   Psychosocial issues?  No   What is the patient's perception of their health status since discharge?  Improving   Does the patient have any of the following symptoms?  Cough   Nursing Interventions  Nurse provided patient education   Pulse Ox monitoring  None   Is the patient/caregiver able to teach back the hierarchy of who to call/visit for symptoms/problems? PCP, Specialist, Home health nurse, Urgent Care, ED, 911  Yes   COVID-19 call completed?  Yes   Wrap up additional comments  Daughter states patient is improving. States still has cough-denies any fever/chills, SOA or chest pain. Denies any needs today. States will replace toothbrush/paste.          Hannah Santillan RN

## 2021-05-06 ENCOUNTER — READMISSION MANAGEMENT (OUTPATIENT)
Dept: CALL CENTER | Facility: HOSPITAL | Age: 61
End: 2021-05-06

## 2021-05-06 NOTE — OUTREACH NOTE
COVID-19 Week 1 Survey      Responses   [facility] patient discharged from?  [place]   Does the patient have one of the following disease processes/diagnoses(primary or secondary)?  COVID-19   COVID-19 underlying condition?  None   Call Number  Call 3   Week 1 Call successful?  Yes   Call start time  1043   Call end time  1046   Discharge diagnosis  COVID-19 virus   Meds reviewed with patient/caregiver?  Yes   Is the patient having any side effects they believe may be caused by any medication additions or changes?  No   Does the patient have all medications ordered at discharge?  Yes   Is the patient taking all medications as directed (includes completed medication regime)?  Yes   Comments regarding appointments  PCP appt 05/07/21 in office-quarantine complete 05/02/21.   Does the patient have a primary care provider?   Yes   Comments regarding PCP  Moving to new PCP closer to her home-scheduled new PCP appt 05/07/21.   Does the patient have an appointment with their PCP or specialist within 7 days of discharge?  Yes   Has the patient kept scheduled appointments due by today?  N/A   Has home health visited the patient within 72 hours of discharge?  N/A   Did the patient receive a copy of their discharge instructions?  Yes   Did the patient receive a copy of COVID-19 specific instructions?  Yes   Nursing interventions  Reviewed instructions with patient   What is the patient's perception of their health status since discharge?  Improving   Does the patient have any of the following symptoms?  -- [Tired]   Nursing Interventions  Nurse provided patient education   Pulse Ox monitoring  None   Is the patient/caregiver able to teach back steps to recovery at home?  Set small, achievable goals for return to baseline health, Rest and rebuild strength, gradually increase activity, Eat a well-balance diet, Practice good oral hygiene   If the patient is a current smoker, are they able to teach back resources for  cessation?  Not a smoker   Is the patient/caregiver able to teach back the hierarchy of who to call/visit for symptoms/problems? PCP, Specialist, Home health nurse, Urgent Care, ED, 911  Yes   COVID-19 call completed?  Yes   Wrap up additional comments  States she is doing better except for the fatigue.           Ekaterina Justice RN

## 2021-05-10 ENCOUNTER — OFFICE VISIT (OUTPATIENT)
Dept: CARDIOLOGY | Facility: CLINIC | Age: 61
End: 2021-05-10

## 2021-05-10 VITALS
BODY MASS INDEX: 27.32 KG/M2 | HEART RATE: 101 BPM | WEIGHT: 164 LBS | DIASTOLIC BLOOD PRESSURE: 90 MMHG | HEIGHT: 65 IN | SYSTOLIC BLOOD PRESSURE: 152 MMHG

## 2021-05-10 DIAGNOSIS — I25.10 CORONARY ARTERY DISEASE INVOLVING NATIVE CORONARY ARTERY OF NATIVE HEART WITHOUT ANGINA PECTORIS: Primary | ICD-10-CM

## 2021-05-10 DIAGNOSIS — I10 ESSENTIAL HYPERTENSION: ICD-10-CM

## 2021-05-10 PROCEDURE — 93000 ELECTROCARDIOGRAM COMPLETE: CPT | Performed by: INTERNAL MEDICINE

## 2021-05-10 PROCEDURE — 99214 OFFICE O/P EST MOD 30 MIN: CPT | Performed by: INTERNAL MEDICINE

## 2021-05-10 RX ORDER — BACLOFEN 20 MG/1
1 TABLET ORAL 4 TIMES DAILY
COMMUNITY
Start: 2021-04-30

## 2021-05-10 RX ORDER — MONTELUKAST SODIUM 10 MG/1
10 TABLET ORAL DAILY
COMMUNITY
Start: 2021-04-04

## 2021-05-10 RX ORDER — SPIRONOLACTONE 25 MG/1
25 TABLET ORAL DAILY
Qty: 90 TABLET | Refills: 3 | Status: SHIPPED | OUTPATIENT
Start: 2021-05-10 | End: 2022-06-08

## 2021-05-10 NOTE — PROGRESS NOTES
Subjective:     Encounter Date: 05/10/21      Patient ID: Grace Mcclain is a 60 y.o. female.    Chief Complaint: CAD, CHF, telehealth due to ongoing pandemic  Coronary Artery Disease         This is a 59-year-old woman with CAD and ischemic cardiomyopathy. She suffered acute anterior MI in 2002.  She was seen and treated by Dr. Wren at [facility] and received a LAD stent at that time.  When I saw her in early 2020 she described recurrent angina, and had an abnormal stress test. LHC showed severe LAD disease and she received 2 VIC from ostial to proximal and mid to distal LAD, as well as the mid RCA. At that time she was found to have an ischemic CM with EF of 35-40%.     Today she returns for follow up. She was recently admitted with COVID PNA and metabolic encephalopathy. She was discharged a week ago and is still fatigued and foggy. She says she doesn't remember her hospitalization at all.  It was positive that she was also affected by polypharmacy.  She takes Percocet and baclofen as well as some other muscle relaxers for chronic back pain.  She was very nauseous at home with Covid and was taking several Phenergan's.  She had some improvement with mental status using 1 dose of Narcan on admission, however her encephalopathy lasted several days.  We reviewed the events of her hospitalization because she is having a lot of family stress about this.  Her sons are accusing her of being a drug addict as result of her hospitalization.  She is actually under the care of pain management and has been taking these medications for several years without any problems.  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  She has no a lot of energy so she is only working 4 hours a day.  No angina or shortness of breath she is just mostly fatigue.    She was a smoker but has not had the desire since Covid.      The following portions of the patient's history were reviewed and updated as  appropriate: allergies, current medications, past family history, past medical history, past social history, past surgical history and problem list.     REVIEW OF SYSTEMS:   All systems reviewed.  Pertinent positives identified in HPI.  All other systems are negative.    Past Medical History:   Diagnosis Date   • CAD (coronary artery disease)    • Diabetes mellitus (CMS/HCC)    • Elevated cholesterol    • Essential hypertension    • Ischemic cardiomyopathy    • Mixed hyperlipidemia    • Myocardial infarction (CMS/[facility])    • LAYTON on auto CPAP 2020    Home sleep study.  Moderate LAYTON with AHI 15.2 events per hour.  No sleep-related hypoxia.  The patient snored 6% of the total monitoring time.   • PE (pulmonary thromboembolism) (CMS/HCC)        Family History   Problem Relation Age of Onset   • Other Mother          in motor vehicle accident .   • Other Father          in motor vehicle accident in .     Social History     Socioeconomic History   • Marital status:      Spouse name: Jose Mcclain   • Number of children: 2   • Years of education: 12   • Highest education level: High school graduate   Tobacco Use   • Smoking status: Current Every Day Smoker     Packs/day: 0.50     Years: 36.00     Pack years: 18.00     Types: Cigarettes     Start date:    • Smokeless tobacco: Never Used   • Tobacco comment: daily caffeine use   Substance and Sexual Activity   • Alcohol use: No   • Drug use: No   • Sexual activity: Defer     No Known Allergies    Past Surgical History:   Procedure Laterality Date   • BACK SURGERY     • CARDIAC CATHETERIZATION N/A 2020    Procedure: Left Heart Cath;  Surgeon: Saman Dyer MD;  Location: [facility] CATH INVASIVE LOCATION;  Service: Cardiovascular;  Laterality: N/A;   • CARDIAC CATHETERIZATION N/A 2020    Procedure: Left ventriculography;  Surgeon: Saman Dyer MD;  Location: [facility] CATH INVASIVE LOCATION;  Service: Cardiovascular;   Laterality: N/A;   • CARDIAC CATHETERIZATION N/A 2/28/2020    Procedure: Stent VIC coronary;  Surgeon: Saman Dyer MD;  Location:  [facility] CATH INVASIVE LOCATION;  Service: Cardiovascular;  Laterality: N/A;  rca/lad   • CARDIAC CATHETERIZATION N/A 2/28/2020    Procedure: Coronary angiography;  Surgeon: Saman Dyer MD;  Location:  [facility] CATH INVASIVE LOCATION;  Service: Cardiovascular;  Laterality: N/A;   • CARDIAC CATHETERIZATION N/A 2/28/2020    Procedure: Percutaneous Coronary Intervention;  Surgeon: Saman Dyer MD;  Location:  [facility] CATH INVASIVE LOCATION;  Service: Cardiovascular;  Laterality: N/A;   • CARDIAC SURGERY     • EYE SURGERY     • HYSTERECTOMY     • NECK SURGERY     • SHOULDER SURGERY Left        ECG 12 Lead    Date/Time: 5/10/2021 2:46 PM  Performed by: Ana Mack MD  Authorized by: Ana Mack MD   Comparison: compared with previous ECG from 8/28/2020  Similar to previous ECG  Rhythm: sinus rhythm  Rate: tachycardic  Conduction: conduction normal  Q waves: V1, V2 and V3    T inversion: V4, V5 and V6  QRS axis: normal    Clinical impression: abnormal EKG             Objective:       GEN: no distress, alert and oriented  Eyes: normal sclerae, normal lids and lashes  HENT: moist mucous membranes,   Lungs: CTAB, no rales or wheezes  Chest: no abnormalities  Neck: no JVD or carotid bruits  CV: RRR, no murmurs, +2 DP and 2+ carotid pulses b/l  Abdomen: soft, nontender, nondistended  Extremities: no edema  Skin: no rash, warm, dry  Heme/Lymph: no bruising  Psych: organized thought, normal behavior and affect       Assessment:          Diagnosis Plan   1. Coronary artery disease involving native coronary artery of native heart without angina pectoris     2. Essential hypertension            Plan:       1. CAD: S/p LAD stenting in 2002 and repeat PCI in 2020 to the ostial LAD, mid-distal LAD (3.5x38 and 3.5 x15), and mid RCA (4.0x38). ASA. Can stop Effient as it has been  >12 months since her PCI.     2. Ischemic CM: EF 35-40%. Continue Toprol 100mg daily, Lisinopril 10 BID. Restart spironolactone 25 today.      3. HTN: Elevated, restart spironolactone.    4. HLD: statin     5. Smoking cessation: Hasn't smoked since COVID    6. Diabetes: On metformin.      7. HLD: On appropriate statin therapy.    Dr. Mccain, thank you very much for referring this kind patient to me.  Please call with any questions or concerns.  She will follow-up with me in 3 months.        Ana Mack MD  05/10/21  [facility]    Outpatient Encounter Medications as of 5/10/2021   Medication Sig Dispense Refill   • ALPRAZolam (XANAX) 0.5 MG tablet Take 0.5 tablets by mouth 2 (Two) Times a Day As Needed for Anxiety.     • aspirin 81 MG chewable tablet Chew 1 tablet Daily. 30 tablet 11   • baclofen (LIORESAL) 20 MG tablet Take 1 tablet by mouth 4 (Four) Times a Day.     • escitalopram (LEXAPRO) 10 MG tablet Take  by mouth every night at bedtime.  10   • isosorbide mononitrate (IMDUR) 30 MG 24 hr tablet Take 30 mg by mouth Daily.     • lisinopril (PRINIVIL,ZESTRIL) 10 MG tablet TAKE 1 TABLET BY MOUTH TWICE DAILY 60 tablet 0   • metFORMIN (GLUCOPHAGE) 1000 MG tablet Take 1,000 mg by mouth Daily.     • metoprolol succinate XL (TOPROL-XL) 100 MG 24 hr tablet Take 50 mg by mouth Daily.     • nicotine (NICODERM CQ) 21 MG/24HR patch Place 1 patch on the skin as directed by provider Daily. 28 each 1   • nitroglycerin (NITROSTAT) 0.4 MG SL tablet 1 under the tongue as needed for angina, may repeat q5mins for up three doses. Call 911 if no relief in chest pain after 1st dose. 25 tablet 11   • oxyCODONE-acetaminophen (PERCOCET)  MG per tablet Take 1 tablet by mouth Every 12 (Twelve) Hours As Needed for Moderate Pain .     • pantoprazole (PROTONIX) 40 MG EC tablet Take 40 mg by mouth 2 (Two) Times a Day.     • promethazine (PHENERGAN) 25 MG tablet Take 25 mg by mouth Every 6 (Six) Hours As Needed for  Nausea or Vomiting.     • rosuvastatin (CRESTOR) 20 MG tablet Take 20 mg by mouth Daily.     • montelukast (SINGULAIR) 10 MG tablet Take 10 mg by mouth Daily.     • spironolactone (ALDACTONE) 25 MG tablet Take 1 tablet by mouth Daily. 90 tablet 3   • [DISCONTINUED] prasugrel (EFFIENT) 10 MG tablet Take 1 tablet by mouth Daily. 30 tablet 11   • [DISCONTINUED] spironolactone (ALDACTONE) 25 MG tablet Take 1 tablet by mouth Daily. 90 tablet 3     No facility-administered encounter medications on file as of 5/10/2021.

## 2021-05-13 ENCOUNTER — READMISSION MANAGEMENT (OUTPATIENT)
Dept: CALL CENTER | Facility: HOSPITAL | Age: 61
End: 2021-05-13

## 2021-05-13 NOTE — OUTREACH NOTE
COVID-19 Week 2 Survey      Responses   [facility] patient discharged from?  [place]   Does the patient have one of the following disease processes/diagnoses(primary or secondary)?  COVID-19   COVID-19 underlying condition?  None   Call Number  Call 1   COVID-19 Week 2: Call 1 attempt successful?  Yes   Call start time  1054   Call end time  1101   Discharge diagnosis  COVID-19 virus   Meds reviewed with patient/caregiver?  Yes   Is the patient having any side effects they believe may be caused by any medication additions or changes?  No   Does the patient have all medications ordered at discharge?  Yes   Is the patient taking all medications as directed (includes completed medication regime)?  Yes   Does the patient have a primary care provider?   Yes   Does the patient have an appointment with their PCP or specialist within 7 days of discharge?  Yes   Has the patient kept scheduled appointments due by today?  Yes   Has home health visited the patient within 72 hours of discharge?  N/A   Psychosocial issues?  No   Comments  states has returned to work with reduced hours, and has tolerated well   Did the patient receive a copy of their discharge instructions?  Yes   Did the patient receive a copy of COVID-19 specific instructions?  Yes   Nursing interventions  Reviewed instructions with patient   What is the patient's perception of their health status since discharge?  Improving   Does the patient have any of the following symptoms?  Cough   Nursing Interventions  Nurse provided patient education   Pulse Ox monitoring  None   Is the patient/caregiver able to teach back steps to recovery at home?  Set small, achievable goals for return to baseline health, Rest and rebuild strength, gradually increase activity, Eat a well-balance diet [states has poor appetite, encouraged pt to try small, more frequent meals]   Is the patient/caregiver able to teach back the hierarchy of who to call/visit for  symptoms/problems? PCP, Specialist, Home health nurse, Urgent Care, ED, 911  Yes   COVID-19 call completed?  Yes          Henna Brothers RN

## 2021-05-18 ENCOUNTER — READMISSION MANAGEMENT (OUTPATIENT)
Dept: CALL CENTER | Facility: HOSPITAL | Age: 61
End: 2021-05-18

## 2021-05-18 NOTE — OUTREACH NOTE
COVID-19 Week 3 Survey      Responses   [facility] patient discharged from?  [place]   Does the patient have one of the following disease processes/diagnoses(primary or secondary)?  COVID-19   COVID-19 underlying condition?  None   Call Number  Call 1   COVID-19 Week 3: Call 1 attempt successful?  No   Discharge diagnosis  COVID-19 virus          Magnolia Allen RN

## 2021-05-25 ENCOUNTER — READMISSION MANAGEMENT (OUTPATIENT)
Dept: CALL CENTER | Facility: HOSPITAL | Age: 61
End: 2021-05-25

## 2021-05-25 NOTE — OUTREACH NOTE
COVID-19 Week 4 Survey      Responses   [facility] patient discharged from?  [place]   Does the patient have one of the following disease processes/diagnoses(primary or secondary)?  COVID-19   COVID-19 underlying condition?  None   Call Number  Call 1   COVID-19 Week 4: Call 1 attempt successful?  Yes   Call start time  0917   Call end time  0922   Discharge diagnosis  COVID-19 virus   Meds reviewed with patient/caregiver?  Yes   Is the patient having any side effects they believe may be caused by any medication additions or changes?  No   Does the patient have all medications ordered at discharge?  Yes   Is the patient taking all medications as directed (includes completed medication regime)?  Yes   Has the patient kept scheduled appointments due by today?  Yes   Is the patient still receiving Home Health Services?  N/A   What is the patient's perception of their health status since discharge?  Improving   Does the patient have any of the following symptoms?  Cough   Nursing Interventions  Nurse provided patient education   Pulse Ox monitoring  None   Is the patient/caregiver able to teach back steps to recovery at home?  Set small, achievable goals for return to baseline health, Rest and rebuild strength, gradually increase activity, Eat a well-balance diet [states appetite has improved]   Is the patient/caregiver able to teach back the hierarchy of who to call/visit for symptoms/problems? PCP, Specialist, Home health nurse, Urgent Care, ED, 911  Yes [states follows a low sodium DM diet]   Is the patient interested in additional calls from an ambulatory ?  NOTE:  applies to high risk patients requiring additional follow-up.  No   Did the patient feel the follow up calls were helpful during their recovery period?  Yes   Was the number of calls appropriate?  Yes          Henna Brothers RN

## 2021-11-15 ENCOUNTER — OFFICE VISIT (OUTPATIENT)
Dept: CARDIOLOGY | Facility: CLINIC | Age: 61
End: 2021-11-15

## 2021-11-15 VITALS
BODY MASS INDEX: 27.66 KG/M2 | DIASTOLIC BLOOD PRESSURE: 88 MMHG | HEIGHT: 65 IN | HEART RATE: 69 BPM | SYSTOLIC BLOOD PRESSURE: 142 MMHG | WEIGHT: 166 LBS

## 2021-11-15 DIAGNOSIS — Z99.89 OSA ON CPAP: ICD-10-CM

## 2021-11-15 DIAGNOSIS — E11.59 CONTROLLED TYPE 2 DIABETES MELLITUS WITH OTHER CIRCULATORY COMPLICATION, WITH LONG-TERM CURRENT USE OF INSULIN (HCC): ICD-10-CM

## 2021-11-15 DIAGNOSIS — Z95.5 S/P DRUG ELUTING CORONARY STENT PLACEMENT: ICD-10-CM

## 2021-11-15 DIAGNOSIS — I25.5 ISCHEMIC CARDIOMYOPATHY: ICD-10-CM

## 2021-11-15 DIAGNOSIS — I25.119 CORONARY ARTERY DISEASE INVOLVING NATIVE CORONARY ARTERY OF NATIVE HEART WITH ANGINA PECTORIS (HCC): Primary | ICD-10-CM

## 2021-11-15 DIAGNOSIS — Z86.16 HISTORY OF COVID-19: ICD-10-CM

## 2021-11-15 DIAGNOSIS — E78.2 MIXED HYPERLIPIDEMIA: ICD-10-CM

## 2021-11-15 DIAGNOSIS — G47.33 OSA ON CPAP: ICD-10-CM

## 2021-11-15 DIAGNOSIS — F17.210 CIGARETTE NICOTINE DEPENDENCE WITHOUT COMPLICATION: ICD-10-CM

## 2021-11-15 DIAGNOSIS — I25.2 HX OF MYOCARDIAL INFARCTION: ICD-10-CM

## 2021-11-15 DIAGNOSIS — I10 ESSENTIAL HYPERTENSION: ICD-10-CM

## 2021-11-15 DIAGNOSIS — Z79.4 CONTROLLED TYPE 2 DIABETES MELLITUS WITH OTHER CIRCULATORY COMPLICATION, WITH LONG-TERM CURRENT USE OF INSULIN (HCC): ICD-10-CM

## 2021-11-15 PROBLEM — U07.1 COVID-19 VIRUS DETECTED: Status: RESOLVED | Noted: 2021-04-30 | Resolved: 2021-11-15

## 2021-11-15 PROCEDURE — 99214 OFFICE O/P EST MOD 30 MIN: CPT | Performed by: NURSE PRACTITIONER

## 2021-11-15 PROCEDURE — 93000 ELECTROCARDIOGRAM COMPLETE: CPT | Performed by: NURSE PRACTITIONER

## 2021-11-15 PROCEDURE — 99407 BEHAV CHNG SMOKING > 10 MIN: CPT | Performed by: NURSE PRACTITIONER

## 2021-11-15 RX ORDER — NICOTINE 21 MG/24HR
1 PATCH, TRANSDERMAL 24 HOURS TRANSDERMAL EVERY 24 HOURS
Qty: 7 EACH | Refills: 0 | Status: SHIPPED | OUTPATIENT
Start: 2021-11-15

## 2021-11-15 RX ORDER — LISINOPRIL 20 MG/1
20 TABLET ORAL 2 TIMES DAILY
Qty: 30 TABLET | Refills: 11 | Status: SHIPPED | OUTPATIENT
Start: 2021-11-15 | End: 2022-07-13

## 2021-11-15 RX ORDER — GABAPENTIN 600 MG/1
600 TABLET ORAL 2 TIMES DAILY
COMMUNITY
Start: 2021-10-23

## 2021-11-15 NOTE — PROGRESS NOTES
Date of Office Visit: 11/15/2021  Encounter Provider: CHRISTI Vargas  Place of Service: [facility]  Patient Name: Grace Mcclain  :1960    Chief Complaint   Patient presents with   • Coronary Artery Disease     6 month f/u   :     HPI: Grace Mcclain is a 61 y.o. female who is a patient of  Dr. Mack and is new to me today.  She has a history of coronary disease and suffered an acute anterior wall MI in .  She got a stent to the LAD by Dr. Wren at [facility].  She also had ischemic cardiomyopathy.  She was seen in early  and had a stress test due to recurrent angina.  She got left heart cath and had severe disease in the LAD and received 2 drug-eluting stents to the ostial to proximal and mid to distal LAD.  As well as the mid RCA.  She was found at that time to have an EF of 35-40.    Earlier this year she was admitted with COVID-19 pneumonia and metabolic encephalopathy.  She came to see us in the office in May after a week of being hospitalized.  She still had some fatigue and brain fog.  She apparently was taking Phenergan at home because of nausea from Covid and had some improvement but her mental status change and she got a dose of Narcan when she was admitted to the hospital.  Her encephalopathy lasted several days and she was having a lot of stress with her family and they were accusing her of being a drug addict.  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  She was only working part-time.  And had not smoked since Covid.    At her last visit we stopped her Effient as it had been 12 months since her PCI.  We also started her on some spironolactone for her low EF.  She comes in today for follow-up.  She has no symptoms of angina.  Unfortunately she has started smoking back again.  She is trying to quit but is under a lot of stress with her family.  Apparently her older and younger  son are not talking to her and have moved away.  She has a daughter who lives near her and she sees her grandchildren.  She is back to working at the school.  She still struggles with some fatigue from Covid but denies any shortness of breath.  Previous testing and notes have been reviewed by me.   Past Medical History:   Diagnosis Date   • CAD (coronary artery disease)    • Diabetes mellitus (HCC)    • Elevated cholesterol    • Essential hypertension    • Ischemic cardiomyopathy    • Mixed hyperlipidemia    • Myocardial infarction ([facility])    • LAYTON on auto CPAP 07/20/2020    Home sleep study.  Moderate LAYTON with AHI 15.2 events per hour.  No sleep-related hypoxia.  The patient snored 6% of the total monitoring time.   • PE (pulmonary thromboembolism) ([facility])        Past Surgical History:   Procedure Laterality Date   • BACK SURGERY     • CARDIAC CATHETERIZATION N/A 2/28/2020    Procedure: Left Heart Cath;  Surgeon: Saman Dyer MD;  Location: [facility] CATH INVASIVE LOCATION;  Service: Cardiovascular;  Laterality: N/A;   • CARDIAC CATHETERIZATION N/A 2/28/2020    Procedure: Left ventriculography;  Surgeon: Saman Dyer MD;  Location: [facility] CATH INVASIVE LOCATION;  Service: Cardiovascular;  Laterality: N/A;   • CARDIAC CATHETERIZATION N/A 2/28/2020    Procedure: Stent VIC coronary;  Surgeon: Saman Dyer MD;  [facility];  Service: Cardiovascular;  Laterality: N/A;  rca/lad   • CARDIAC CATHETERIZATION N/A 2/28/2020    Procedure: Coronary angiography;  Surgeon: Saman Dyer MD;  [facility];  Service: Cardiovascular;  Laterality: N/A;   • CARDIAC CATHETERIZATION N/A 2/28/2020    Procedure: Percutaneous Coronary Intervention;  Surgeon: Saman Dyer MD;  Location: [facility] CATH INVASIVE LOCATION;  Service: Cardiovascular;  Laterality: N/A;   • CARDIAC SURGERY     • EYE SURGERY     • HYSTERECTOMY     • NECK SURGERY     •  SHOULDER SURGERY Left        Social History     Socioeconomic History   • Marital status:      Spouse name: Jose Mcclain   • Number of children: 2   • Years of education: 12   • Highest education level: High school graduate   Tobacco Use   • Smoking status: Current Every Day Smoker     Packs/day: 0.50     Years: 36.00     Pack years: 18.00     Types: Cigarettes     Start date:    • Smokeless tobacco: Never Used   • Tobacco comment: daily caffeine use   Substance and Sexual Activity   • Alcohol use: No   • Drug use: No   • Sexual activity: Defer       Family History   Problem Relation Age of Onset   • Other Mother          in motor vehicle accident .   • Other Father          in motor vehicle accident in .       ROS    No Known Allergies      Current Outpatient Medications:   •  ALPRAZolam (XANAX) 0.5 MG tablet, Take 0.5 tablets by mouth 2 (Two) Times a Day As Needed for Anxiety., Disp: , Rfl:   •  aspirin 81 MG chewable tablet, Chew 1 tablet Daily., Disp: 30 tablet, Rfl: 11  •  baclofen (LIORESAL) 20 MG tablet, Take 1 tablet by mouth 4 (Four) Times a Day., Disp: , Rfl:   •  escitalopram (LEXAPRO) 10 MG tablet, Take  by mouth every night at bedtime., Disp: , Rfl: 10  •  gabapentin (NEURONTIN) 600 MG tablet, Take 600 mg by mouth 2 (Two) Times a Day., Disp: , Rfl:   •  isosorbide mononitrate (IMDUR) 30 MG 24 hr tablet, Take 30 mg by mouth Daily., Disp: , Rfl:   •  lisinopril (PRINIVIL,ZESTRIL) 20 MG tablet, Take 1 tablet by mouth 2 (Two) Times a Day., Disp: 30 tablet, Rfl: 11  •  metFORMIN (GLUCOPHAGE) 1000 MG tablet, Take 1,000 mg by mouth Daily., Disp: , Rfl:   •  metoprolol succinate XL (TOPROL-XL) 100 MG 24 hr tablet, Take 50 mg by mouth Daily., Disp: , Rfl:   •  montelukast (SINGULAIR) 10 MG tablet, Take 10 mg by mouth Daily., Disp: , Rfl:   •  nitroglycerin (NITROSTAT) 0.4 MG SL tablet, 1 under the tongue as needed for angina, may repeat q5mins for up three doses. Call 911 if no relief in  "chest pain after 1st dose., Disp: 25 tablet, Rfl: 11  •  oxyCODONE-acetaminophen (PERCOCET)  MG per tablet, Take 1 tablet by mouth Every 12 (Twelve) Hours As Needed for Moderate Pain ., Disp: , Rfl:   •  pantoprazole (PROTONIX) 40 MG EC tablet, Take 40 mg by mouth 2 (Two) Times a Day., Disp: , Rfl:   •  promethazine (PHENERGAN) 25 MG tablet, Take 25 mg by mouth Every 6 (Six) Hours As Needed for Nausea or Vomiting., Disp: , Rfl:   •  rosuvastatin (CRESTOR) 20 MG tablet, Take 20 mg by mouth Daily., Disp: , Rfl:   •  spironolactone (ALDACTONE) 25 MG tablet, Take 1 tablet by mouth Daily., Disp: 90 tablet, Rfl: 3  •  nicotine (NICODERM CQ) 14 MG/24HR patch, Place 1 patch on the skin as directed by provider Daily., Disp: 7 each, Rfl: 0  •  nicotine (NICODERM CQ) 21 MG/24HR patch, Place 1 patch on the skin as directed by provider Daily., Disp: 7 each, Rfl: 0  •  nicotine (NICODERM CQ) 7 MG/24HR patch, Place 1 patch on the skin as directed by provider Daily., Disp: 7 each, Rfl: 0      Objective:     Vitals:    11/15/21 1449   BP: 142/88   Pulse: 69   Weight: 75.3 kg (166 lb)   Height: 165.1 cm (65\")     Body mass index is 27.62 kg/m².    PHYSICAL EXAM:    Constitutional:       General: Not in acute distress.     Appearance: Normal appearance. Well-developed.   Eyes:      Pupils: Pupils are equal, round, and reactive to light.   HENT:      Head: Normocephalic.   Neck:      Vascular: No carotid bruit or JVD.   Pulmonary:      Effort: Pulmonary effort is normal. No tachypnea.      Breath sounds: Normal breath sounds. No wheezing. No rales.   Cardiovascular:      Normal rate. Regular rhythm.      S3 Gallop.   Pulses:     Intact distal pulses.   Abdominal:      General: Bowel sounds are normal.      Palpations: Abdomen is soft.      Tenderness: There is no abdominal tenderness.   Musculoskeletal: Normal range of motion.      Cervical back: Normal range of motion and neck supple. No edema. Skin:     General: Skin is warm " and dry.   Neurological:      Mental Status: Alert and oriented to person, place, and time.           ECG 12 Lead    Date/Time: 11/15/2021 3:44 PM  Performed by: Amber Olivas APRN  Authorized by: Amber Olivas APRN   Comparison: compared with previous ECG from 5/10/2021  Rhythm: sinus rhythm  Rate: normal  Q waves: V1, V2 and V3    QRS axis: normal    Clinical impression: non-specific ECG              Assessment:       Diagnosis Plan   1. Coronary artery disease involving native coronary artery of native heart with angina pectoris (HCC)     2. Essential hypertension     3. Hx of myocardial infarction     4. Ischemic cardiomyopathy     5. S/P drug eluting coronary stent placement     6. Mixed hyperlipidemia     7. Controlled type 2 diabetes mellitus with other circulatory complication, with long-term current use of insulin (HCC)     8. LAYTON on auto CPAP     9. History of COVID-19     10. Cigarette nicotine dependence without complication       Orders Placed This Encounter   Procedures   • ECG 12 Lead     This order was created via procedure documentation     Order Specific Question:   Release to patient     Answer:   Immediate          Plan:       Her blood pressure is little high today I am getting increase her lisinopril to 20 mg a day.  She would like to try to stop smoking and would like to try the nicotine patches so I have called her in a tapering regimen of that.  She is in a come back in 6 months and will not be smoking at that time.  She is tolerating Aldactone without issue.  I encouraged her to increase her physical activity she is active at work doing a lot of physical lifting and walking however I discussed with her this is not take the place of aerobic exercise.  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    Grace Mcclain  reports that she has been smoking cigarettes. She started smoking about 42 years ago. She has a 18.00 pack-year smoking  history. She has never used smokeless tobacco.. I have educated her on the risk of diseases from using tobacco products such as cancer, COPD and heart disease.     I advised her to quit and she is willing to quit. We have discussed the following method/s for tobacco cessation:  Prescription Medicaiton.  Together we have set a quit date for 1 month from today.  She will follow up with me in 6 months or sooner to check on her progress.    I spent >10 minutes counseling the patient.                Your medication list          Accurate as of November 15, 2021  3:44 PM. If you have any questions, ask your nurse or doctor.            CHANGE how you take these medications      Instructions Last Dose Given Next Dose Due   lisinopril 20 MG tablet  Commonly known as: PRINIVIL,ZESTRIL  What changed:   · medication strength  · how much to take  Changed by: CHRISTI Vargas      Take 1 tablet by mouth 2 (Two) Times a Day.       nicotine 21 MG/24HR patch  Commonly known as: NICODERM CQ  What changed: Another medication with the same name was added. Make sure you understand how and when to take each.  Changed by: CHRISTI Vargas      Place 1 patch on the skin as directed by provider Daily.       nicotine 14 MG/24HR patch  Commonly known as: NICODERM CQ  What changed: You were already taking a medication with the same name, and this prescription was added. Make sure you understand how and when to take each.  Changed by: CHRISTI Vargas      Place 1 patch on the skin as directed by provider Daily.       nicotine 7 MG/24HR patch  Commonly known as: NICODERM CQ  What changed: You were already taking a medication with the same name, and this prescription was added. Make sure you understand how and when to take each.  Changed by: CHRISTI Vargas      Place 1 patch on the skin as directed by provider Daily.          CONTINUE taking these medications      Instructions Last Dose Given Next Dose Due   ALPRAZolam  0.5 MG tablet  Commonly known as: XANAX      Take 0.5 tablets by mouth 2 (Two) Times a Day As Needed for Anxiety.       aspirin 81 MG chewable tablet      Chew 1 tablet Daily.       baclofen 20 MG tablet  Commonly known as: LIORESAL      Take 1 tablet by mouth 4 (Four) Times a Day.       escitalopram 10 MG tablet  Commonly known as: LEXAPRO      Take  by mouth every night at bedtime.       gabapentin 600 MG tablet  Commonly known as: NEURONTIN      Take 600 mg by mouth 2 (Two) Times a Day.       isosorbide mononitrate 30 MG 24 hr tablet  Commonly known as: IMDUR      Take 30 mg by mouth Daily.       metFORMIN 1000 MG tablet  Commonly known as: GLUCOPHAGE      Take 1,000 mg by mouth Daily.       metoprolol succinate  MG 24 hr tablet  Commonly known as: TOPROL-XL      Take 50 mg by mouth Daily.       montelukast 10 MG tablet  Commonly known as: SINGULAIR      Take 10 mg by mouth Daily.       nitroglycerin 0.4 MG SL tablet  Commonly known as: NITROSTAT      1 under the tongue as needed for angina, may repeat q5mins for up three doses. Call 911 if no relief in chest pain after 1st dose.       oxyCODONE-acetaminophen  MG per tablet  Commonly known as: PERCOCET      Take 1 tablet by mouth Every 12 (Twelve) Hours As Needed for Moderate Pain .       pantoprazole 40 MG EC tablet  Commonly known as: PROTONIX      Take 40 mg by mouth 2 (Two) Times a Day.       promethazine 25 MG tablet  Commonly known as: PHENERGAN      Take 25 mg by mouth Every 6 (Six) Hours As Needed for Nausea or Vomiting.       rosuvastatin 20 MG tablet  Commonly known as: CRESTOR      Take 20 mg by mouth Daily.       spironolactone 25 MG tablet  Commonly known as: ALDACTONE      Take 1 tablet by mouth Daily.             Where to Get Your Medications      These medications were sent to [facility] [address] [place] [address] - [address] [facility] - [phone] [facility] [phone]   [address] [facility] [address]    Phone:  [phone]   · lisinopril 20 MG tablet  · nicotine 14 MG/24HR patch  · nicotine 21 MG/24HR patch  · nicotine 7 MG/24HR patch           As always, it has been a pleasure to participate in your patient's care.      Sincerely,     Amber BARRAZA

## 2022-06-08 RX ORDER — SPIRONOLACTONE 25 MG/1
25 TABLET ORAL DAILY
Qty: 90 TABLET | Refills: 3 | Status: SHIPPED | OUTPATIENT
Start: 2022-06-08 | End: 2022-07-13

## 2022-07-12 NOTE — TELEPHONE ENCOUNTER
Spoke with pt   She is taking Lisinopril 20 mg BID (updated script)  She is unsure about Spironolactone and she is at work  She will call us back when she gets home and checks dose

## 2022-07-13 ENCOUNTER — TELEPHONE (OUTPATIENT)
Dept: CARDIOLOGY | Facility: CLINIC | Age: 62
End: 2022-07-13

## 2022-07-13 RX ORDER — SPIRONOLACTONE 25 MG/1
25 TABLET ORAL DAILY
Qty: 90 TABLET | Refills: 3 | Status: SHIPPED | OUTPATIENT
Start: 2022-07-13

## 2022-07-13 RX ORDER — LISINOPRIL 20 MG/1
20 TABLET ORAL 2 TIMES DAILY
Qty: 180 TABLET | Refills: 1 | Status: SHIPPED | OUTPATIENT
Start: 2022-07-13

## 2022-07-13 NOTE — TELEPHONE ENCOUNTER
Pt left vm. She would like you to know she takes Losartan 20 mg 2x daily  And Spironolactone 25 mg Once daily.

## 2022-12-15 ENCOUNTER — TELEPHONE (OUTPATIENT)
Dept: CARDIOLOGY | Facility: CLINIC | Age: 62
End: 2022-12-15

## 2022-12-15 NOTE — TELEPHONE ENCOUNTER
Rec'd a form from [facility] for cardiac clearance.  Put in Amber pink.    saadia [facility]  12/15/22

## 2022-12-21 ENCOUNTER — OFFICE VISIT (OUTPATIENT)
Dept: CARDIOLOGY | Facility: CLINIC | Age: 62
End: 2022-12-21

## 2022-12-21 ENCOUNTER — HOSPITAL ENCOUNTER (OUTPATIENT)
Dept: CARDIOLOGY | Facility: HOSPITAL | Age: 62
Discharge: HOME | End: 2022-12-21
Admitting: NURSE PRACTITIONER

## 2022-12-21 ENCOUNTER — TELEPHONE (OUTPATIENT)
Dept: CARDIOLOGY | Facility: CLINIC | Age: 62
End: 2022-12-21

## 2022-12-21 ENCOUNTER — DOCUMENTATION (OUTPATIENT)
Dept: CARDIOLOGY | Facility: CLINIC | Age: 62
End: 2022-12-21

## 2022-12-21 VITALS
HEIGHT: 65 IN | BODY MASS INDEX: 28.49 KG/M2 | HEART RATE: 76 BPM | DIASTOLIC BLOOD PRESSURE: 57 MMHG | OXYGEN SATURATION: 93 % | WEIGHT: 171 LBS | SYSTOLIC BLOOD PRESSURE: 101 MMHG

## 2022-12-21 VITALS — OXYGEN SATURATION: 93 %

## 2022-12-21 DIAGNOSIS — I25.5 ISCHEMIC CARDIOMYOPATHY: ICD-10-CM

## 2022-12-21 DIAGNOSIS — E78.2 MIXED HYPERLIPIDEMIA: ICD-10-CM

## 2022-12-21 DIAGNOSIS — Z95.5 S/P DRUG ELUTING CORONARY STENT PLACEMENT: ICD-10-CM

## 2022-12-21 DIAGNOSIS — I26.99 PULMONARY EMBOLISM, UNSPECIFIED CHRONICITY, UNSPECIFIED PULMONARY EMBOLISM TYPE, UNSPECIFIED WHETHER ACUTE COR PULMONALE PRESENT: ICD-10-CM

## 2022-12-21 DIAGNOSIS — I10 ESSENTIAL HYPERTENSION: ICD-10-CM

## 2022-12-21 DIAGNOSIS — I25.119 CORONARY ARTERY DISEASE INVOLVING NATIVE CORONARY ARTERY OF NATIVE HEART WITH ANGINA PECTORIS: Primary | ICD-10-CM

## 2022-12-21 DIAGNOSIS — Z86.16 HISTORY OF COVID-19: ICD-10-CM

## 2022-12-21 DIAGNOSIS — Z79.4 CONTROLLED TYPE 2 DIABETES MELLITUS WITH OTHER CIRCULATORY COMPLICATION, WITH LONG-TERM CURRENT USE OF INSULIN: ICD-10-CM

## 2022-12-21 DIAGNOSIS — I25.2 HX OF MYOCARDIAL INFARCTION: ICD-10-CM

## 2022-12-21 DIAGNOSIS — D69.6 THROMBOCYTOPENIA: ICD-10-CM

## 2022-12-21 DIAGNOSIS — E11.59 CONTROLLED TYPE 2 DIABETES MELLITUS WITH OTHER CIRCULATORY COMPLICATION, WITH LONG-TERM CURRENT USE OF INSULIN: ICD-10-CM

## 2022-12-21 LAB
AORTIC ARCH: 2 CM
ASCENDING AORTA: 2.9 CM
BH CV ECHO MEAS - ACS: 1.97 CM
BH CV ECHO MEAS - AO MAX PG: 5.9 MMHG
BH CV ECHO MEAS - AO MEAN PG: 3.8 MMHG
BH CV ECHO MEAS - AO ROOT DIAM: 3.5 CM
BH CV ECHO MEAS - AO V2 MAX: 121.6 CM/SEC
BH CV ECHO MEAS - AO V2 VTI: 27.3 CM
BH CV ECHO MEAS - AVA(I,D): 1.82 CM2
BH CV ECHO MEAS - EDV(CUBED): 94.8 ML
BH CV ECHO MEAS - EDV(MOD-SP2): 107 ML
BH CV ECHO MEAS - EDV(MOD-SP4): 163 ML
BH CV ECHO MEAS - EF(MOD-BP): 42.4 %
BH CV ECHO MEAS - EF(MOD-SP2): 38.3 %
BH CV ECHO MEAS - EF(MOD-SP4): 50.9 %
BH CV ECHO MEAS - ESV(CUBED): 25.4 ML
BH CV ECHO MEAS - ESV(MOD-SP2): 66 ML
BH CV ECHO MEAS - ESV(MOD-SP4): 80 ML
BH CV ECHO MEAS - FS: 35.5 %
BH CV ECHO MEAS - IVS/LVPW: 1.2 CM
BH CV ECHO MEAS - IVSD: 1.65 CM
BH CV ECHO MEAS - LAT PEAK E' VEL: 8.9 CM/SEC
BH CV ECHO MEAS - LV DIASTOLIC VOL/BSA (35-75): 88.1 CM2
BH CV ECHO MEAS - LV MASS(C)D: 284.7 GRAMS
BH CV ECHO MEAS - LV MAX PG: 3.9 MMHG
BH CV ECHO MEAS - LV MEAN PG: 2.08 MMHG
BH CV ECHO MEAS - LV SYSTOLIC VOL/BSA (12-30): 43.2 CM2
BH CV ECHO MEAS - LV V1 MAX: 98.1 CM/SEC
BH CV ECHO MEAS - LV V1 VTI: 19.8 CM
BH CV ECHO MEAS - LVIDD: 4.6 CM
BH CV ECHO MEAS - LVIDS: 2.9 CM
BH CV ECHO MEAS - LVOT AREA: 2.5 CM2
BH CV ECHO MEAS - LVOT DIAM: 1.79 CM
BH CV ECHO MEAS - LVPWD: 1.37 CM
BH CV ECHO MEAS - MED PEAK E' VEL: 9.4 CM/SEC
BH CV ECHO MEAS - MV A DUR: 0.11 SEC
BH CV ECHO MEAS - MV A MAX VEL: 100.7 CM/SEC
BH CV ECHO MEAS - MV DEC SLOPE: 524.4 CM/SEC2
BH CV ECHO MEAS - MV DEC TIME: 0.22 MSEC
BH CV ECHO MEAS - MV E MAX VEL: 84.8 CM/SEC
BH CV ECHO MEAS - MV E/A: 0.84
BH CV ECHO MEAS - MV MAX PG: 3.6 MMHG
BH CV ECHO MEAS - MV MEAN PG: 1.66 MMHG
BH CV ECHO MEAS - MV P1/2T: 53.4 MSEC
BH CV ECHO MEAS - MV V2 VTI: 25.7 CM
BH CV ECHO MEAS - MVA(P1/2T): 4.1 CM2
BH CV ECHO MEAS - MVA(VTI): 1.92 CM2
BH CV ECHO MEAS - PA ACC TIME: 0.13 SEC
BH CV ECHO MEAS - PA PR(ACCEL): 19.6 MMHG
BH CV ECHO MEAS - PA V2 MAX: 89.5 CM/SEC
BH CV ECHO MEAS - PULM A REVS DUR: 0.14 SEC
BH CV ECHO MEAS - PULM A REVS VEL: 30.8 CM/SEC
BH CV ECHO MEAS - PULM DIAS VEL: 33 CM/SEC
BH CV ECHO MEAS - PULM S/D: 0.97
BH CV ECHO MEAS - PULM SYS VEL: 32.1 CM/SEC
BH CV ECHO MEAS - QP/QS: 0.65
BH CV ECHO MEAS - RAP SYSTOLE: 3 MMHG
BH CV ECHO MEAS - RV MAX PG: 1.46 MMHG
BH CV ECHO MEAS - RV V1 MAX: 60.4 CM/SEC
BH CV ECHO MEAS - RV V1 VTI: 14.7 CM
BH CV ECHO MEAS - RVOT DIAM: 1.67 CM
BH CV ECHO MEAS - RVSP: 25 MMHG
BH CV ECHO MEAS - SI(MOD-SP2): 22.2 ML/M2
BH CV ECHO MEAS - SI(MOD-SP4): 44.8 ML/M2
BH CV ECHO MEAS - SUP REN AO DIAM: 1.9 CM
BH CV ECHO MEAS - SV(LVOT): 49.5 ML
BH CV ECHO MEAS - SV(MOD-SP2): 41 ML
BH CV ECHO MEAS - SV(MOD-SP4): 83 ML
BH CV ECHO MEAS - SV(RVOT): 32.2 ML
BH CV ECHO MEAS - TAPSE (>1.6): 2.22 CM
BH CV ECHO MEAS - TR MAX PG: 21.8 MMHG
BH CV ECHO MEAS - TR MAX VEL: 233.7 CM/SEC
BH CV ECHO MEASUREMENTS AVERAGE E/E' RATIO: 9.27
BH CV XLRA - RV BASE: 3.1 CM
BH CV XLRA - RV LENGTH: 6.5 CM
BH CV XLRA - RV MID: 2.21 CM
BH CV XLRA - TDI S': 15.4 CM/SEC
LEFT ATRIUM VOLUME INDEX: 16.5 ML/M2
MAXIMAL PREDICTED HEART RATE: 160 BPM
SINUS: 2.9 CM
STJ: 2.4 CM
STRESS TARGET HR: 136 BPM

## 2022-12-21 PROCEDURE — 93306 TTE W/DOPPLER COMPLETE: CPT | Performed by: STUDENT IN AN ORGANIZED HEALTH CARE EDUCATION/TRAINING PROGRAM

## 2022-12-21 PROCEDURE — 93306 TTE W/DOPPLER COMPLETE: CPT

## 2022-12-21 PROCEDURE — 93000 ELECTROCARDIOGRAM COMPLETE: CPT | Performed by: NURSE PRACTITIONER

## 2022-12-21 PROCEDURE — 99214 OFFICE O/P EST MOD 30 MIN: CPT | Performed by: NURSE PRACTITIONER

## 2022-12-21 PROCEDURE — 25010000002 PERFLUTREN (DEFINITY) 8.476 MG IN SODIUM CHLORIDE (PF) 0.9 % 10 ML INJECTION: Performed by: NURSE PRACTITIONER

## 2022-12-21 RX ORDER — GLIPIZIDE 10 MG/1
10 TABLET ORAL DAILY
COMMUNITY
Start: 2022-10-14

## 2022-12-21 RX ORDER — PIOGLITAZONEHYDROCHLORIDE 30 MG/1
TABLET ORAL
COMMUNITY
Start: 2022-10-17

## 2022-12-21 RX ADMIN — PERFLUTREN 1.5 ML: 6.52 INJECTION, SUSPENSION INTRAVENOUS at 11:28

## 2022-12-21 NOTE — TELEPHONE ENCOUNTER
----- Message from CHRISTI Vargas sent at 12/21/2022  2:14 PM EST -----  Please let patient know her echo from today shows her heart is a little stronger. Still not normal but improved. I sent a clearance letter over to her surgeon. Thanks   ----- Message -----  From: Alex Gloria MD  Sent: 12/21/2022   1:15 PM EST  To: CHRISTI Vargas

## 2022-12-21 NOTE — TELEPHONE ENCOUNTER
Called and left VM. Will continue to try to reach patient.     Allyson Arroyo RN  Triage [facility]

## 2022-12-21 NOTE — PROGRESS NOTES
Date of Office Visit: 2022  Encounter Provider: CHRISTI Vargas  Place of Service: [facility] CARDIOLOGY  Patient Name: Grace Mcclain  :1960    Chief Complaint   Patient presents with   • Follow-up   :     HPI: Grace Mcclain is a 61 y.o. female who is a patient of  Dr. Mack and is new to me today.  She has a history of coronary disease and suffered an acute anterior wall MI in .  She got a stent to the LAD by Dr. Wren at [facility].  She also had ischemic cardiomyopathy.  She was seen in early  and had a stress test due to recurrent angina.  She got left heart cath and had severe disease in the LAD and received 2 drug-eluting stents to the ostial to proximal and mid to distal LAD.  As well as the mid RCA.  She was found at that time to have an EF of 35-40.    Earlier this year she was admitted with COVID-19 pneumonia and metabolic encephalopathy.  She came to see us in the office in May after a week of being hospitalized.  She still had some fatigue and brain fog.  She apparently was taking Phenergan at home because of nausea from Covid and had some improvement but her mental status change and she got a dose of Narcan when she was admitted to the hospital.  Her encephalopathy lasted several days and she was having a lot of stress with her family and they were accusing her of being a drug addict.  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  She was only working part-time.  And had not smoked since Covid.    I saw her in November we had stopped her Effient as it had been 12 months.  Her medicines were optimized for heart failure and she was started on Aldactone.  Unfortunately she started smoking again due to family stress.  Her blood pressure was high I increase her lisinopril to 20 I also prescribed her some nicotine patches and recommend she try to increase her physical activity.    Her  blood pressure is doing better.  She is scheduled for cervical spinal fusion in 2 days with Dr. Magaña at [facility].  She has been holding her aspirin for the last week.  She denies chest pain, palpitations or tightness.  Previous testing and notes have been reviewed by me.   Past Medical History:   Diagnosis Date   • CAD (coronary artery disease)    • Diabetes mellitus (HCC)    • Elevated cholesterol    • Essential hypertension    • Ischemic cardiomyopathy    • Mixed hyperlipidemia    • Myocardial infarction (HCC)    • LAYTON on auto CPAP 07/20/2020    Home sleep study.  Moderate LAYTON with AHI 15.2 events per hour.  No sleep-related hypoxia.  The patient snored 6% of the total monitoring time.   • PE (pulmonary thromboembolism) ([facility])        Past Surgical History:   Procedure Laterality Date   • BACK SURGERY     • CARDIAC CATHETERIZATION N/A 2/28/2020    Procedure: Left Heart Cath;  Surgeon: Saman Dyer MD;  Location:  [facility] CATH INVASIVE LOCATION;  Service: Cardiovascular;  Laterality: N/A;   • CARDIAC CATHETERIZATION N/A 2/28/2020    Procedure: Left ventriculography;  Surgeon: Saman Dyer MD;  [facility];  Service: Cardiovascular;  Laterality: N/A;   • CARDIAC CATHETERIZATION N/A 2/28/2020    Procedure: Stent VIC coronary;  Surgeon: Saman Dyer MD;  Location:  [facility] CATH INVASIVE LOCATION;  Service: Cardiovascular;  Laterality: N/A;  rca/lad   • CARDIAC CATHETERIZATION N/A 2/28/2020    Procedure: Coronary angiography;  Surgeon: Saman Dyer MD;  [facility];  Service: Cardiovascular;  Laterality: N/A;   • CARDIAC CATHETERIZATION N/A 2/28/2020    Procedure: Percutaneous Coronary Intervention;  Surgeon: Saman Dyer MD;  [facility];  Service: Cardiovascular;  Laterality: N/A;   • CARDIAC SURGERY     • EYE SURGERY     • HYSTERECTOMY     • NECK SURGERY     • SHOULDER SURGERY Left         Social History     Socioeconomic History   • Marital status:      Spouse name: Jose Mcclain   • Number of children: 2   • Years of education: 12   • Highest education level: High school graduate   Tobacco Use   • Smoking status: Every Day     Packs/day: 0.50     Years: 36.00     Pack years: 18.00     Types: Cigarettes     Start date:    • Smokeless tobacco: Never   • Tobacco comments:     daily caffeine use   Substance and Sexual Activity   • Alcohol use: No   • Drug use: No   • Sexual activity: Defer       Family History   Problem Relation Age of Onset   • Other Mother          in motor vehicle accident .   • Other Father          in motor vehicle accident in .       Review of Systems   Constitutional: Negative for diaphoresis and malaise/fatigue.   Cardiovascular: Negative for chest pain, claudication, dyspnea on exertion, irregular heartbeat, leg swelling, near-syncope, orthopnea, palpitations, paroxysmal nocturnal dyspnea and syncope.   Respiratory: Negative for cough, shortness of breath and sleep disturbances due to breathing.    Musculoskeletal: Positive for back pain. Negative for falls.   Neurological: Negative for dizziness and weakness.   Psychiatric/Behavioral: Negative for altered mental status and substance abuse.       No Known Allergies      Current Outpatient Medications:   •  ALPRAZolam (XANAX) 0.5 MG tablet, Take 0.5 tablets by mouth 2 (Two) Times a Day As Needed for Anxiety., Disp: , Rfl:   •  aspirin 81 MG chewable tablet, Chew 1 tablet Daily., Disp: 30 tablet, Rfl: 11  •  escitalopram (LEXAPRO) 10 MG tablet, Take  by mouth every night at bedtime., Disp: , Rfl: 10  •  gabapentin (NEURONTIN) 600 MG tablet, Take 600 mg by mouth 2 (Two) Times a Day., Disp: , Rfl:   •  glipizide (GLUCOTROL) 10 MG tablet, Take 10 mg by mouth Daily., Disp: , Rfl:   •  isosorbide mononitrate (IMDUR) 30 MG 24 hr tablet, Take 30 mg by mouth Daily., Disp: , Rfl:   •  lisinopril  "(PRINIVIL,ZESTRIL) 20 MG tablet, Take 1 tablet by mouth 2 (Two) Times a Day., Disp: 180 tablet, Rfl: 1  •  metFORMIN (GLUCOPHAGE) 1000 MG tablet, Take 1,000 mg by mouth Daily., Disp: , Rfl:   •  metoprolol succinate XL (TOPROL-XL) 100 MG 24 hr tablet, Take 50 mg by mouth Daily., Disp: , Rfl:   •  montelukast (SINGULAIR) 10 MG tablet, Take 10 mg by mouth Daily., Disp: , Rfl:   •  nicotine (NICODERM CQ) 14 MG/24HR patch, Place 1 patch on the skin as directed by provider Daily., Disp: 7 each, Rfl: 0  •  nicotine (NICODERM CQ) 21 MG/24HR patch, Place 1 patch on the skin as directed by provider Daily., Disp: 7 each, Rfl: 0  •  nicotine (NICODERM CQ) 7 MG/24HR patch, Place 1 patch on the skin as directed by provider Daily., Disp: 7 each, Rfl: 0  •  nitroglycerin (NITROSTAT) 0.4 MG SL tablet, 1 under the tongue as needed for angina, may repeat q5mins for up three doses. Call 911 if no relief in chest pain after 1st dose., Disp: 25 tablet, Rfl: 11  •  oxyCODONE-acetaminophen (PERCOCET)  MG per tablet, Take 1 tablet by mouth Every 12 (Twelve) Hours As Needed for Moderate Pain ., Disp: , Rfl:   •  pantoprazole (PROTONIX) 40 MG EC tablet, Take 40 mg by mouth 2 (Two) Times a Day., Disp: , Rfl:   •  pioglitazone (ACTOS) 30 MG tablet, TAKE 1 TABLET BY MOUTH EVERY DAY FOR DIABETES, Disp: , Rfl:   •  promethazine (PHENERGAN) 25 MG tablet, Take 25 mg by mouth Every 6 (Six) Hours As Needed for Nausea or Vomiting., Disp: , Rfl:   •  rosuvastatin (CRESTOR) 20 MG tablet, Take 20 mg by mouth Daily., Disp: , Rfl:   •  spironolactone (ALDACTONE) 25 MG tablet, TAKE 1 TABLET BY MOUTH DAILY, Disp: 90 tablet, Rfl: 3  •  baclofen (LIORESAL) 20 MG tablet, Take 1 tablet by mouth 4 (Four) Times a Day., Disp: , Rfl:       Objective:     Vitals:    12/21/22 1009   BP: 101/57   Pulse: 76   SpO2: 93%   Weight: 77.6 kg (171 lb)   Height: 165.1 cm (65\")     Body mass index is 28.46 kg/m².    PHYSICAL EXAM:    Constitutional:       General: Not in " acute distress.     Appearance: Normal appearance. Well-developed.   Eyes:      Pupils: Pupils are equal, round, and reactive to light.   HENT:      Head: Normocephalic.   Neck:      Vascular: No carotid bruit or JVD.   Pulmonary:      Effort: Pulmonary effort is normal. No tachypnea.      Breath sounds: Normal breath sounds. No wheezing. No rales.   Cardiovascular:      Normal rate. Regular rhythm.      No gallop.   Pulses:     Intact distal pulses.   Edema:     Peripheral edema absent.   Abdominal:      General: Bowel sounds are normal.      Palpations: Abdomen is soft.      Tenderness: There is no abdominal tenderness.   Musculoskeletal: Normal range of motion.      Cervical back: Normal range of motion and neck supple. No edema. Skin:     General: Skin is warm and dry.   Neurological:      Mental Status: Alert and oriented to person, place, and time.           ECG 12 Lead    Date/Time: 12/21/2022 10:32 AM  Performed by: Amber Olivas APRN  Authorized by: Amber Olivas APRN   Comparison: compared with previous ECG from 11/15/2021  Similar to previous ECG  Rhythm: sinus rhythm  Rate: normal  Q waves: V1, V2 and V3    QRS axis: normal    Clinical impression: non-specific ECG              Assessment:       Diagnosis Plan   1. Coronary artery disease involving native coronary artery of native heart with angina pectoris ([facility])        2. Essential hypertension        3. Ischemic cardiomyopathy        4. Mixed hyperlipidemia        5. S/P drug eluting coronary stent placement        6. Pulmonary embolism, unspecified chronicity, unspecified pulmonary embolism type, unspecified whether acute cor pulmonale present ([facility])        7. Thrombocytopenia ([facility])        8. Controlled type 2 diabetes mellitus with other circulatory complication, with long-term current use of insulin ([facility])        9. History of COVID-19          No orders of the defined types were placed in this encounter.         Plan:       Change her EKG  today her symptoms are stable unfortunately she has continued to smoke she wants to try to quit and is going to continue to use the nicotine patches.  She is hoping by going in the hospital for neck surgery it will help kick start her smoking cessation.  I Marilee get a stat echo today we have not followed up on her heart failure and I like to see what her EF is before she undergoes anesthesia.  I recommended she take her metoprolol with a sip of water the morning of surgery.  She can come back and see me in 3 months         Your medication list          Accurate as of December 21, 2022 10:23 AM. If you have any questions, ask your nurse or doctor.            CONTINUE taking these medications      Instructions Last Dose Given Next Dose Due   ALPRAZolam 0.5 MG tablet  Commonly known as: XANAX      Take 0.5 tablets by mouth 2 (Two) Times a Day As Needed for Anxiety.       aspirin 81 MG chewable tablet      Chew 1 tablet Daily.       baclofen 20 MG tablet  Commonly known as: LIORESAL      Take 1 tablet by mouth 4 (Four) Times a Day.       escitalopram 10 MG tablet  Commonly known as: LEXAPRO      Take  by mouth every night at bedtime.       gabapentin 600 MG tablet  Commonly known as: NEURONTIN      Take 600 mg by mouth 2 (Two) Times a Day.       glipizide 10 MG tablet  Commonly known as: GLUCOTROL      Take 10 mg by mouth Daily.       isosorbide mononitrate 30 MG 24 hr tablet  Commonly known as: IMDUR      Take 30 mg by mouth Daily.       lisinopril 20 MG tablet  Commonly known as: PRINIVIL,ZESTRIL      Take 1 tablet by mouth 2 (Two) Times a Day.       metFORMIN 1000 MG tablet  Commonly known as: GLUCOPHAGE      Take 1,000 mg by mouth Daily.       metoprolol succinate  MG 24 hr tablet  Commonly known as: TOPROL-XL      Take 50 mg by mouth Daily.       montelukast 10 MG tablet  Commonly known as: SINGULAIR      Take 10 mg by mouth Daily.       nicotine 21 MG/24HR patch  Commonly known as: NICODERM CQ      Place 1  patch on the skin as directed by provider Daily.       nicotine 14 MG/24HR patch  Commonly known as: NICODERM CQ      Place 1 patch on the skin as directed by provider Daily.       nicotine 7 MG/24HR patch  Commonly known as: NICODERM CQ      Place 1 patch on the skin as directed by provider Daily.       nitroglycerin 0.4 MG SL tablet  Commonly known as: NITROSTAT      1 under the tongue as needed for angina, may repeat q5mins for up three doses. Call 911 if no relief in chest pain after 1st dose.       oxyCODONE-acetaminophen  MG per tablet  Commonly known as: PERCOCET      Take 1 tablet by mouth Every 12 (Twelve) Hours As Needed for Moderate Pain .       pantoprazole 40 MG EC tablet  Commonly known as: PROTONIX      Take 40 mg by mouth 2 (Two) Times a Day.       pioglitazone 30 MG tablet  Commonly known as: ACTOS      TAKE 1 TABLET BY MOUTH EVERY DAY FOR DIABETES       promethazine 25 MG tablet  Commonly known as: PHENERGAN      Take 25 mg by mouth Every 6 (Six) Hours As Needed for Nausea or Vomiting.       rosuvastatin 20 MG tablet  Commonly known as: CRESTOR      Take 20 mg by mouth Daily.       spironolactone 25 MG tablet  Commonly known as: ALDACTONE      TAKE 1 TABLET BY MOUTH DAILY                As always, it has been a pleasure to participate in your patient's care.      Sincerely,     Amber BARRAZA

## 2022-12-22 NOTE — TELEPHONE ENCOUNTER
Notified patient of results/recommendations. Patient verbalized understanding.    Allyson Arroyo RN  Triage [facility]

## 2023-05-30 RX ORDER — SPIRONOLACTONE 25 MG/1
25 TABLET ORAL DAILY
Qty: 90 TABLET | Refills: 3 | Status: SHIPPED | OUTPATIENT
Start: 2023-05-30

## 2023-08-01 RX ORDER — LISINOPRIL 20 MG/1
TABLET ORAL
Qty: 180 TABLET | Refills: 1 | Status: SHIPPED | OUTPATIENT
Start: 2023-08-01

## 2023-09-07 ENCOUNTER — OFFICE VISIT (OUTPATIENT)
Dept: CARDIOLOGY | Facility: CLINIC | Age: 63
End: 2023-09-07
Payer: MEDICARE

## 2023-09-07 VITALS
OXYGEN SATURATION: 96 % | SYSTOLIC BLOOD PRESSURE: 100 MMHG | WEIGHT: 171 LBS | BODY MASS INDEX: 28.49 KG/M2 | HEART RATE: 64 BPM | HEIGHT: 65 IN | DIASTOLIC BLOOD PRESSURE: 66 MMHG

## 2023-09-07 DIAGNOSIS — Z86.16 HISTORY OF COVID-19: ICD-10-CM

## 2023-09-07 DIAGNOSIS — G47.33 OSA ON CPAP: ICD-10-CM

## 2023-09-07 DIAGNOSIS — I25.119 CORONARY ARTERY DISEASE INVOLVING NATIVE CORONARY ARTERY OF NATIVE HEART WITH ANGINA PECTORIS: ICD-10-CM

## 2023-09-07 DIAGNOSIS — Z95.5 S/P DRUG ELUTING CORONARY STENT PLACEMENT: Primary | ICD-10-CM

## 2023-09-07 DIAGNOSIS — I10 ESSENTIAL HYPERTENSION: ICD-10-CM

## 2023-09-07 DIAGNOSIS — I73.9 PVD (PERIPHERAL VASCULAR DISEASE) WITH CLAUDICATION: ICD-10-CM

## 2023-09-07 DIAGNOSIS — Z79.4 CONTROLLED TYPE 2 DIABETES MELLITUS WITH OTHER CIRCULATORY COMPLICATION, WITH LONG-TERM CURRENT USE OF INSULIN: ICD-10-CM

## 2023-09-07 DIAGNOSIS — E11.59 CONTROLLED TYPE 2 DIABETES MELLITUS WITH OTHER CIRCULATORY COMPLICATION, WITH LONG-TERM CURRENT USE OF INSULIN: ICD-10-CM

## 2023-09-07 DIAGNOSIS — I25.5 ISCHEMIC CARDIOMYOPATHY: ICD-10-CM

## 2023-09-07 DIAGNOSIS — Z71.6 TOBACCO ABUSE COUNSELING: ICD-10-CM

## 2023-09-07 DIAGNOSIS — Z99.89 OSA ON CPAP: ICD-10-CM

## 2023-09-07 DIAGNOSIS — E78.2 MIXED HYPERLIPIDEMIA: ICD-10-CM

## 2023-09-07 RX ORDER — ISOSORBIDE MONONITRATE 60 MG/1
30 TABLET, EXTENDED RELEASE ORAL DAILY
Qty: 30 TABLET | Refills: 11 | Status: SHIPPED | OUTPATIENT
Start: 2023-09-07

## 2023-09-07 RX ORDER — LANCETS 28 GAUGE
EACH MISCELLANEOUS
COMMUNITY
Start: 2023-07-05

## 2023-09-07 RX ORDER — NICOTINE 21 MG/24HR
1 PATCH, TRANSDERMAL 24 HOURS TRANSDERMAL EVERY 24 HOURS
Qty: 7 EACH | Refills: 0 | Status: SHIPPED | OUTPATIENT
Start: 2023-09-07

## 2023-09-07 RX ORDER — BLOOD-GLUCOSE METER
KIT MISCELLANEOUS
COMMUNITY
Start: 2023-07-31

## 2023-09-07 RX ORDER — PRASUGREL 10 MG/1
TABLET, FILM COATED ORAL
COMMUNITY
End: 2023-09-07

## 2023-09-07 NOTE — PROGRESS NOTES
Date of Office Visit: 2023  Encounter Provider: CHRISTI Vargas  Place of Service: [facility] CARDIOLOGY  Patient Name: Grace Mcclain  :1960    Chief Complaint   Patient presents with    Coronary Artery Disease    Follow-up   :     HPI: Grace Mcclain is a 61 y.o. female who is a patient of  Dr. Mack and is known to me today.  She has a history of coronary disease and suffered an acute anterior wall MI in .  She got a stent to the LAD by Dr. Wren at [facility].  She also had ischemic cardiomyopathy.  She was seen in early  and had a stress test due to recurrent angina.  She got left heart cath and had severe disease in the LAD and received 2 drug-eluting stents to the ostial to proximal and mid to distal LAD.  As well as the mid RCA.  She was found at that time to have an EF of 35-40.     Earlier this year she was admitted with COVID-19 pneumonia and metabolic encephalopathy.  She came to see us in the office in May after a week of being hospitalized.  She still had some fatigue and brain fog.  She apparently was taking Phenergan at home because of nausea from Covid and had some improvement but her mental status change and she got a dose of Narcan when she was admitted to the hospital.  Her encephalopathy lasted several days and she was having a lot of stress with her family and they were accusing her of being a drug addict.  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  She was only working part-time.  And had not smoked since Covid.     I saw her in November we had stopped her Effient as it had been 12 months.  Her medicines were optimized for heart failure and she was started on Aldactone.  Unfortunately she started smoking again due to family stress.  Her blood pressure was high I increase her lisinopril to 20 I also prescribed her some nicotine patches and recommend she try to increase her  physical activity.    I saw her in the office 6 months ago in December her blood pressure was doing better she was getting scheduled for spinal fusion and had been holding her aspirin.  We talked about stopping smoking and I prescribed some nicotine patches.  We did an echocardiogram EF looked about the same at 41 to 45% some inferior septal and apical wall motion abnormalities.  RVSP was normal.  She is here for follow-up today.    She comes in she is actually been having some chest discomfort.  She said she is intermittently getting some burning in her chest when she increases her exertional level.  It does not happen all the time it is intermittent.  If she sits and rests it goes away.  She also has continued to smoke.  She has tried several means of smoking such as nicotine patches, cold turkey and Chantix.  She also states she has been getting some numbness and pain in her legs worse when she exerts herself.  Previous testing and notes have been reviewed by me.   Past Medical History:   Diagnosis Date    CAD (coronary artery disease)     Diabetes mellitus     Elevated cholesterol     Essential hypertension     Ischemic cardiomyopathy     Mixed hyperlipidemia     Myocardial infarction     LAYTON on auto CPAP 07/20/2020    Home sleep study.  Moderate LAYTON with AHI 15.2 events per hour.  No sleep-related hypoxia.  The patient snored 6% of the total monitoring time.    PE (pulmonary thromboembolism)        Past Surgical History:   Procedure Laterality Date    BACK SURGERY      CARDIAC CATHETERIZATION N/A 2/28/2020    Procedure: Left Heart Cath;  Surgeon: Saman Dyer MD;  Location: [facility] INVASIVE LOCATION;  Service: Cardiovascular;  Laterality: N/A;    CARDIAC CATHETERIZATION N/A 2/28/2020    Procedure: Left ventriculography;  Surgeon: Saman Dyer MD;  Location: [facility] INVASIVE LOCATION;  Service: Cardiovascular;  Laterality: N/A;    CARDIAC CATHETERIZATION N/A 2/28/2020    Procedure:  Stent VIC coronary;  Surgeon: Saman Dyer MD;  Location: [facility] CATH INVASIVE LOCATION;  Service: Cardiovascular;  Laterality: N/A;  rca/lad    CARDIAC CATHETERIZATION N/A 2020    Procedure: Coronary angiography;  Surgeon: Saman Dyer MD;  Location:  [facility] CATH INVASIVE LOCATION;  Service: Cardiovascular;  Laterality: N/A;    CARDIAC CATHETERIZATION N/A 2020    Procedure: Percutaneous Coronary Intervention;  Surgeon: Saman Dyre MD;  Location: [facility] CATH INVASIVE LOCATION;  Service: Cardiovascular;  Laterality: N/A;    CARDIAC SURGERY      EYE SURGERY      HYSTERECTOMY      NECK SURGERY      SHOULDER SURGERY Left        Social History     Socioeconomic History    Marital status:      Spouse name: Jose Mcclain    Number of children: 2    Years of education: 12    Highest education level: High school graduate   Tobacco Use    Smoking status: Every Day     Packs/day: 0.50     Years: 36.00     Pack years: 18.00     Types: Cigarettes     Start date:     Smokeless tobacco: Never    Tobacco comments:     daily caffeine use   Substance and Sexual Activity    Alcohol use: No    Drug use: No    Sexual activity: Defer       Family History   Problem Relation Age of Onset    Other Mother          in motor vehicle accident .    Other Father          in motor vehicle accident in .       Review of Systems   Constitutional: Negative for diaphoresis and malaise/fatigue.   Cardiovascular:  Positive for chest pain. Negative for claudication, dyspnea on exertion, irregular heartbeat, leg swelling, near-syncope, orthopnea, palpitations, paroxysmal nocturnal dyspnea and syncope.   Respiratory:  Negative for cough, shortness of breath and sleep disturbances due to breathing.    Musculoskeletal:  Negative for falls.   Neurological:  Positive for numbness. Negative for dizziness and weakness.   Psychiatric/Behavioral:  Negative for altered mental status and substance abuse.       No Known Allergies      Current Outpatient Medications:     ALPRAZolam (XANAX) 0.5 MG tablet, Take 0.5 tablets by mouth 2 (Two) Times a Day As Needed for Anxiety., Disp: , Rfl:     aspirin 81 MG chewable tablet, Chew 1 tablet Daily., Disp: 30 tablet, Rfl: 11    baclofen (LIORESAL) 20 MG tablet, Take 1 tablet by mouth 4 (Four) Times a Day., Disp: , Rfl:     escitalopram (LEXAPRO) 10 MG tablet, Take  by mouth every night at bedtime., Disp: , Rfl: 10    FREESTYLE LITE test strip, TEST AS DIRECTED EVERY MORNING, Disp: , Rfl:     gabapentin (NEURONTIN) 600 MG tablet, Take 1 tablet by mouth 2 (Two) Times a Day., Disp: , Rfl:     glipizide (GLUCOTROL) 10 MG tablet, Take 1 tablet by mouth Daily., Disp: , Rfl:     isosorbide mononitrate (IMDUR) 30 MG 24 hr tablet, Take 1 tablet by mouth Daily., Disp: , Rfl:     Lancets (freestyle) lancets, TEST AS DIRECTED EVERY MORNING, Disp: , Rfl:     lisinopril (PRINIVIL,ZESTRIL) 20 MG tablet, TAKE 1 TABLET BY MOUTH TWICE DAILY, Disp: 180 tablet, Rfl: 1    metFORMIN (GLUCOPHAGE) 1000 MG tablet, Take 1 tablet by mouth Daily., Disp: , Rfl:     metoprolol succinate XL (TOPROL-XL) 100 MG 24 hr tablet, Take 0.5 tablets by mouth Daily., Disp: , Rfl:     montelukast (SINGULAIR) 10 MG tablet, Take 1 tablet by mouth Daily., Disp: , Rfl:     nitroglycerin (NITROSTAT) 0.4 MG SL tablet, 1 under the tongue as needed for angina, may repeat q5mins for up three doses. Call 911 if no relief in chest pain after 1st dose., Disp: 25 tablet, Rfl: 11    oxyCODONE-acetaminophen (PERCOCET)  MG per tablet, Take 1 tablet by mouth Every 12 (Twelve) Hours As Needed for Moderate Pain ., Disp: , Rfl:     pantoprazole (PROTONIX) 40 MG EC tablet, Take 1 tablet by mouth 2 (Two) Times a Day., Disp: , Rfl:     promethazine (PHENERGAN) 25 MG tablet, Take 1 tablet by mouth Every 6 (Six) Hours As Needed for Nausea or Vomiting., Disp: , Rfl:     rosuvastatin (CRESTOR) 20 MG tablet, Take 1 tablet by mouth Daily.,  "Disp: , Rfl:     SEMAGLUTIDE,0.25 OR 0.5MG/DOS, SC, Inject  under the skin into the appropriate area as directed., Disp: , Rfl:     spironolactone (ALDACTONE) 25 MG tablet, TAKE 1 TABLET BY MOUTH DAILY, Disp: 90 tablet, Rfl: 3    nicotine (NICODERM CQ) 14 MG/24HR patch, Place 1 patch on the skin as directed by provider Daily., Disp: 7 each, Rfl: 0    nicotine (NICODERM CQ) 21 MG/24HR patch, Place 1 patch on the skin as directed by provider Daily., Disp: 7 each, Rfl: 0    nicotine (NICODERM CQ) 7 MG/24HR patch, Place 1 patch on the skin as directed by provider Daily., Disp: 7 each, Rfl: 0    pioglitazone (ACTOS) 30 MG tablet, TAKE 1 TABLET BY MOUTH EVERY DAY FOR DIABETES, Disp: , Rfl:     prasugrel (EFFIENT) 10 MG tablet, as directed Orally, Disp: , Rfl:       Objective:     Vitals:    09/07/23 1342   BP: 100/66   Pulse: 64   SpO2: 96%   Weight: 77.6 kg (171 lb)   Height: 165.1 cm (65\")     Body mass index is 28.46 kg/m².    PHYSICAL EXAM:    Constitutional:       General: Not in acute distress.     Appearance: Normal appearance. Well-developed.   Eyes:      Pupils: Pupils are equal, round, and reactive to light.   HENT:      Head: Normocephalic.   Neck:      Vascular: No carotid bruit or JVD.   Pulmonary:      Effort: Pulmonary effort is normal. No tachypnea.      Breath sounds: Normal breath sounds. No wheezing. No rales.   Cardiovascular:      Normal rate. Regular rhythm.      No gallop.    Pulses:     Intact distal pulses.   Edema:     Peripheral edema absent.   Abdominal:      General: Bowel sounds are normal.      Palpations: Abdomen is soft.      Tenderness: There is no abdominal tenderness.   Musculoskeletal: Normal range of motion.      Cervical back: Normal range of motion and neck supple. No edema. Skin:     General: Skin is warm and dry.   Neurological:      Mental Status: Alert and oriented to person, place, and time.         Ekg    Date/Time: 9/7/2023 2:29 PM  Performed by: Amber Olivas, " CHRISTI  Authorized by: Amber Olivas APRN   Comparison: compared with previous ECG from 12/21/2022  Rhythm: sinus rhythm  Rate: normal  Q waves: V1, V2 and V3    QRS axis: normal    Clinical impression: non-specific ECG          Assessment:       Diagnosis Plan   1. S/P drug eluting coronary stent placement     On aspirin and stating   2. Mixed hyperlipidemia     On statin. Last lipid panel was at goal   3. Ischemic cardiomyopathy     On GDMT with bb, acei and aldactone. EF stable on eacho earlier this year   4. Essential hypertension     Bp controlled on current regimen   5. Coronary artery disease involving native coronary artery of native heart with angina pectoris     Having some chest pain. Will increase imdur to 60mg daily and schedule for stress test. EKG stable today   6. Controlled type 2 diabetes mellitus with other circulatory complication, with long-term current use of insulin        7. History of COVID-19        8. LAYTON on auto CPAP        9. Tobacco abuse counseling     Plan on trying nicotene patches again     No orders of the defined types were placed in this encounter.         Plan:       Follow up in 3 months. I will call with test results.          Your medication list            Accurate as of September 7, 2023  1:56 PM. If you have any questions, ask your nurse or doctor.                CONTINUE taking these medications        Instructions Last Dose Given Next Dose Due   ALPRAZolam 0.5 MG tablet  Commonly known as: XANAX      Take 0.5 tablets by mouth 2 (Two) Times a Day As Needed for Anxiety.       aspirin 81 MG chewable tablet      Chew 1 tablet Daily.       baclofen 20 MG tablet  Commonly known as: LIORESAL      Take 1 tablet by mouth 4 (Four) Times a Day.       escitalopram 10 MG tablet  Commonly known as: LEXAPRO      Take  by mouth every night at bedtime.       freestyle lancets      TEST AS DIRECTED EVERY MORNING       FREESTYLE LITE test strip  Generic drug: glucose blood      TEST AS  DIRECTED EVERY MORNING       gabapentin 600 MG tablet  Commonly known as: NEURONTIN      Take 1 tablet by mouth 2 (Two) Times a Day.       glipizide 10 MG tablet  Commonly known as: GLUCOTROL      Take 1 tablet by mouth Daily.       isosorbide mononitrate 30 MG 24 hr tablet  Commonly known as: IMDUR      Take 1 tablet by mouth Daily.       lisinopril 20 MG tablet  Commonly known as: PRINIVIL,ZESTRIL      TAKE 1 TABLET BY MOUTH TWICE DAILY       metFORMIN 1000 MG tablet  Commonly known as: GLUCOPHAGE      Take 1 tablet by mouth Daily.       metoprolol succinate  MG 24 hr tablet  Commonly known as: TOPROL-XL      Take 0.5 tablets by mouth Daily.       montelukast 10 MG tablet  Commonly known as: SINGULAIR      Take 1 tablet by mouth Daily.       nicotine 21 MG/24HR patch  Commonly known as: NICODERM CQ      Place 1 patch on the skin as directed by provider Daily.       nicotine 14 MG/24HR patch  Commonly known as: NICODERM CQ      Place 1 patch on the skin as directed by provider Daily.       nicotine 7 MG/24HR patch  Commonly known as: NICODERM CQ      Place 1 patch on the skin as directed by provider Daily.       nitroglycerin 0.4 MG SL tablet  Commonly known as: NITROSTAT      1 under the tongue as needed for angina, may repeat q5mins for up three doses. Call 911 if no relief in chest pain after 1st dose.       oxyCODONE-acetaminophen  MG per tablet  Commonly known as: PERCOCET      Take 1 tablet by mouth Every 12 (Twelve) Hours As Needed for Moderate Pain .       pantoprazole 40 MG EC tablet  Commonly known as: PROTONIX      Take 1 tablet by mouth 2 (Two) Times a Day.       pioglitazone 30 MG tablet  Commonly known as: ACTOS      TAKE 1 TABLET BY MOUTH EVERY DAY FOR DIABETES       prasugrel 10 MG tablet  Commonly known as: EFFIENT      as directed Orally       promethazine 25 MG tablet  Commonly known as: PHENERGAN      Take 1 tablet by mouth Every 6 (Six) Hours As Needed for Nausea or Vomiting.        rosuvastatin 20 MG tablet  Commonly known as: CRESTOR      Take 1 tablet by mouth Daily.       SEMAGLUTIDE(0.25 OR 0.5MG/DOS) SC      Inject  under the skin into the appropriate area as directed.       spironolactone 25 MG tablet  Commonly known as: ALDACTONE      TAKE 1 TABLET BY MOUTH DAILY                  As always, it has been a pleasure to participate in your patient's care.      Sincerely,     Amber BARRAZA

## 2023-09-19 ENCOUNTER — HOSPITAL ENCOUNTER (OUTPATIENT)
Dept: CARDIOLOGY | Facility: HOSPITAL | Age: 63
Discharge: HOME | End: 2023-09-19
Payer: MEDICARE

## 2023-09-19 ENCOUNTER — TELEPHONE (OUTPATIENT)
Dept: CARDIOLOGY | Facility: CLINIC | Age: 63
End: 2023-09-19
Payer: MEDICARE

## 2023-09-19 VITALS — HEIGHT: 65 IN | BODY MASS INDEX: 27.99 KG/M2 | WEIGHT: 168 LBS

## 2023-09-19 DIAGNOSIS — Z95.5 S/P DRUG ELUTING CORONARY STENT PLACEMENT: ICD-10-CM

## 2023-09-19 DIAGNOSIS — I73.9 PVD (PERIPHERAL VASCULAR DISEASE) WITH CLAUDICATION: ICD-10-CM

## 2023-09-19 LAB
BH CV LOWER ARTERIAL LEFT ABI RATIO: 0.99
BH CV LOWER ARTERIAL LEFT GREAT TOE SYS MAX: 100
BH CV LOWER ARTERIAL LEFT HIGH THIGH SYS MAX: 153
BH CV LOWER ARTERIAL LEFT POPLITEAL SYS MAX: 156
BH CV LOWER ARTERIAL LEFT POST EX ABI RATIO: 1.11
BH CV LOWER ARTERIAL LEFT POST TIBIAL SYS MAX: 137
BH CV LOWER ARTERIAL LEFT TBI RATIO: 0.72
BH CV LOWER ARTERIAL RIGHT ABI RATIO: 1.09
BH CV LOWER ARTERIAL RIGHT GREAT TOE SYS MAX: 124
BH CV LOWER ARTERIAL RIGHT HIGH THIGH SYS MAX: 156
BH CV LOWER ARTERIAL RIGHT POPLITEAL SYS MAX: 162
BH CV LOWER ARTERIAL RIGHT POST EX ABI RATIO: 0.92
BH CV LOWER ARTERIAL RIGHT POST TIBIAL SYS MAX: 150
BH CV LOWER ARTERIAL RIGHT TBI RATIO: 0.9
BH CV NUCLEAR PRIOR STUDY: 2
BH CV REST NUCLEAR ISOTOPE DOSE: 10.8 MCI
BH CV STRESS BP STAGE 1: NORMAL
BH CV STRESS COMMENTS STAGE 1: NORMAL
BH CV STRESS DOSE REGADENOSON STAGE 1: 0.4
BH CV STRESS DURATION MIN STAGE 1: 0
BH CV STRESS DURATION SEC STAGE 1: 10
BH CV STRESS HR STAGE 1: 125
BH CV STRESS NUCLEAR ISOTOPE DOSE: 33.9 MCI
BH CV STRESS PROTOCOL 1: NORMAL
BH CV STRESS RECOVERY BP: NORMAL MMHG
BH CV STRESS RECOVERY HR: 99 BPM
BH CV STRESS STAGE 1: 1
LV EF NUC BP: 49 %
MAXIMAL PREDICTED HEART RATE: 159 BPM
PERCENT MAX PREDICTED HR: 78.62 %
STRESS BASELINE BP: NORMAL MMHG
STRESS BASELINE HR: 72 BPM
STRESS PERCENT HR: 92 %
STRESS POST EXERCISE DUR SEC: 10 SEC
STRESS POST PEAK BP: NORMAL MMHG
STRESS POST PEAK HR: 125 BPM
STRESS TARGET HR: 135 BPM
UPPER ARTERIAL LEFT ARM BRACHIAL SYS MAX: 134
UPPER ARTERIAL RIGHT ARM BRACHIAL SYS MAX: 138

## 2023-09-19 PROCEDURE — 0 TECHNETIUM TETROFOSMIN KIT: Performed by: NURSE PRACTITIONER

## 2023-09-19 PROCEDURE — 93924 LWR XTR VASC STDY BILAT: CPT

## 2023-09-19 PROCEDURE — 93017 CV STRESS TEST TRACING ONLY: CPT

## 2023-09-19 PROCEDURE — A9502 TC99M TETROFOSMIN: HCPCS | Performed by: NURSE PRACTITIONER

## 2023-09-19 PROCEDURE — 78452 HT MUSCLE IMAGE SPECT MULT: CPT

## 2023-09-19 PROCEDURE — 25010000002 REGADENOSON 0.4 MG/5ML SOLUTION: Performed by: NURSE PRACTITIONER

## 2023-09-19 RX ORDER — ISOSORBIDE MONONITRATE 60 MG/1
60 TABLET, EXTENDED RELEASE ORAL EVERY MORNING
Qty: 30 TABLET | Refills: 11 | Status: SHIPPED | OUTPATIENT
Start: 2023-09-19

## 2023-09-19 RX ORDER — REGADENOSON 0.08 MG/ML
0.4 INJECTION, SOLUTION INTRAVENOUS
Status: COMPLETED | OUTPATIENT
Start: 2023-09-19 | End: 2023-09-19

## 2023-09-19 RX ADMIN — TETROFOSMIN 1 DOSE: 1.38 INJECTION, POWDER, LYOPHILIZED, FOR SOLUTION INTRAVENOUS at 12:50

## 2023-09-19 RX ADMIN — REGADENOSON 0.4 MG: 0.08 INJECTION, SOLUTION INTRAVENOUS at 12:50

## 2023-09-19 RX ADMIN — TETROFOSMIN 1 DOSE: 1.38 INJECTION, POWDER, LYOPHILIZED, FOR SOLUTION INTRAVENOUS at 12:00

## 2023-09-19 NOTE — TELEPHONE ENCOUNTER
Spoke to patient about stress test. Symptoms are a little better but only occurring if she is overexerting herself. Ef had previously been 41-45%. Stress calculated 49%. No ischemia on images. I am going to increase her imdur to 60 mg daily and she has an apt with us in a month.

## 2023-10-16 ENCOUNTER — OFFICE VISIT (OUTPATIENT)
Age: 63
End: 2023-10-16
Payer: MEDICARE

## 2023-10-16 ENCOUNTER — LAB (OUTPATIENT)
Dept: LAB | Facility: HOSPITAL | Age: 63
End: 2023-10-16
Payer: MEDICARE

## 2023-10-16 ENCOUNTER — TRANSCRIBE ORDERS (OUTPATIENT)
Dept: CARDIOLOGY | Facility: CLINIC | Age: 63
End: 2023-10-16
Payer: MEDICARE

## 2023-10-16 VITALS
HEART RATE: 68 BPM | BODY MASS INDEX: 27.49 KG/M2 | HEIGHT: 65 IN | SYSTOLIC BLOOD PRESSURE: 130 MMHG | DIASTOLIC BLOOD PRESSURE: 65 MMHG | WEIGHT: 165 LBS

## 2023-10-16 DIAGNOSIS — I25.118 CORONARY ARTERY DISEASE OF NATIVE ARTERY OF NATIVE HEART WITH STABLE ANGINA PECTORIS: Primary | ICD-10-CM

## 2023-10-16 DIAGNOSIS — Z13.6 SCREENING FOR ISCHEMIC HEART DISEASE: Primary | ICD-10-CM

## 2023-10-16 DIAGNOSIS — Z13.6 SCREENING FOR ISCHEMIC HEART DISEASE: ICD-10-CM

## 2023-10-16 DIAGNOSIS — Z01.810 PRE-OPERATIVE CARDIOVASCULAR EXAMINATION: ICD-10-CM

## 2023-10-16 LAB
ANION GAP SERPL CALCULATED.3IONS-SCNC: 11.9 MMOL/L (ref 5–15)
ANISOCYTOSIS BLD QL: ABNORMAL
BUN SERPL-MCNC: 15 MG/DL (ref 8–23)
BUN/CREAT SERPL: 13.2 (ref 7–25)
CALCIUM SPEC-SCNC: 10.3 MG/DL (ref 8.6–10.5)
CHLORIDE SERPL-SCNC: 98 MMOL/L (ref 98–107)
CO2 SERPL-SCNC: 26.1 MMOL/L (ref 22–29)
CREAT SERPL-MCNC: 1.14 MG/DL (ref 0.57–1)
DEPRECATED RDW RBC AUTO: 45.5 FL (ref 37–54)
EGFRCR SERPLBLD CKD-EPI 2021: 54.9 ML/MIN/1.73
EOSINOPHIL # BLD MANUAL: 0.11 10*3/MM3 (ref 0–0.4)
EOSINOPHIL NFR BLD MANUAL: 1 % (ref 0.3–6.2)
ERYTHROCYTE [DISTWIDTH] IN BLOOD BY AUTOMATED COUNT: 17.8 % (ref 12.3–15.4)
GLUCOSE SERPL-MCNC: 98 MG/DL (ref 65–99)
HCT VFR BLD AUTO: 35.2 % (ref 34–46.6)
HGB BLD-MCNC: 11.1 G/DL (ref 12–15.9)
LYMPHOCYTES # BLD MANUAL: 2.57 10*3/MM3 (ref 0.7–3.1)
LYMPHOCYTES NFR BLD MANUAL: 5.1 % (ref 5–12)
MCH RBC QN AUTO: 23 PG (ref 26.6–33)
MCHC RBC AUTO-ENTMCNC: 31.5 G/DL (ref 31.5–35.7)
MCV RBC AUTO: 73 FL (ref 79–97)
MICROCYTES BLD QL: ABNORMAL
MONOCYTES # BLD: 0.58 10*3/MM3 (ref 0.1–0.9)
NEUTROPHILS # BLD AUTO: 8.18 10*3/MM3 (ref 1.7–7)
NEUTROPHILS NFR BLD MANUAL: 71.4 % (ref 42.7–76)
NRBC BLD AUTO-RTO: 0.1 /100 WBC (ref 0–0.2)
OVALOCYTES BLD QL SMEAR: ABNORMAL
PLAT MORPH BLD: NORMAL
PLATELET # BLD AUTO: 241 10*3/MM3 (ref 140–450)
PMV BLD AUTO: 9.8 FL (ref 6–12)
POIKILOCYTOSIS BLD QL SMEAR: ABNORMAL
POLYCHROMASIA BLD QL SMEAR: ABNORMAL
POTASSIUM SERPL-SCNC: 4.8 MMOL/L (ref 3.5–5.2)
RBC # BLD AUTO: 4.82 10*6/MM3 (ref 3.77–5.28)
SODIUM SERPL-SCNC: 136 MMOL/L (ref 136–145)
VARIANT LYMPHS NFR BLD MANUAL: 22.4 % (ref 19.6–45.3)
WBC MORPH BLD: NORMAL
WBC NRBC COR # BLD: 11.46 10*3/MM3 (ref 3.4–10.8)

## 2023-10-16 PROCEDURE — 80048 BASIC METABOLIC PNL TOTAL CA: CPT

## 2023-10-16 PROCEDURE — 36415 COLL VENOUS BLD VENIPUNCTURE: CPT

## 2023-10-16 PROCEDURE — 3078F DIAST BP <80 MM HG: CPT | Performed by: INTERNAL MEDICINE

## 2023-10-16 PROCEDURE — 85025 COMPLETE CBC W/AUTO DIFF WBC: CPT

## 2023-10-16 PROCEDURE — 99214 OFFICE O/P EST MOD 30 MIN: CPT | Performed by: INTERNAL MEDICINE

## 2023-10-16 PROCEDURE — 85007 BL SMEAR W/DIFF WBC COUNT: CPT

## 2023-10-16 PROCEDURE — 3075F SYST BP GE 130 - 139MM HG: CPT | Performed by: INTERNAL MEDICINE

## 2023-10-16 NOTE — PROGRESS NOTES
Subjective:     Encounter Date:10/16/2023      Patient ID: Grace Mcclain is a 63 y.o. female.    Chief Complaint:  HPI:   This is a 63-year-old woman with CAD and ischemic cardiomyopathy. She suffered acute anterior MI in 2002.  She was seen and treated by Dr. Wren at [facility] and received a LAD stent at that time.  When I saw her in early 2020 she described recurrent angina, and had an abnormal stress test. LHC showed severe LAD disease and she received 2 VIC from ostial to proximal and mid to distal LAD, as well as the mid RCA. At that time she was found to have an ischemic CM with EF of 35-40%.   She was seen by our nurse practitioner Amber last month.  At that time she was having new onset chest and back burning which is similar to her prior angina.  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  Show nuclear stress test showing a new apical infarct.  She was started on Imdur.  She returns today.  She states that her symptoms have improved though not totally resolved on Imdur 60.  It does not give her a headache  The following portions of the patient's history were reviewed and updated as appropriate: allergies, current medications, past family history, past medical history, past social history, past surgical history and problem list.     REVIEW OF SYSTEMS:   All systems reviewed.  Pertinent positives identified in HPI.  All other systems are negative.    Past Medical History:   Diagnosis Date    CAD (coronary artery disease)     Diabetes mellitus     Elevated cholesterol     Essential hypertension     Ischemic cardiomyopathy     Mixed hyperlipidemia     Myocardial infarction     LAYTON on auto CPAP 07/20/2020    Home sleep study.  Moderate LAYTON with AHI 15.2 events per hour.  No sleep-related hypoxia.  The patient snored 6% of the total monitoring time.    PE (pulmonary thromboembolism)        Family History   Problem Relation Age of  Onset    Other Mother          in motor vehicle accident .    Other Father          in motor vehicle accident in .       Social History     Socioeconomic History    Marital status:      Spouse name: Jose Mcclain    Number of children: 2    Years of education: 12    Highest education level: High school graduate   Tobacco Use    Smoking status: Every Day     Packs/day: 0.50     Years: 36.00     Additional pack years: 0.00     Total pack years: 18.00     Types: Cigarettes     Start date:     Smokeless tobacco: Never    Tobacco comments:     daily caffeine use   Vaping Use    Vaping Use: Never used   Substance and Sexual Activity    Alcohol use: No    Drug use: No    Sexual activity: Defer       No Known Allergies    Past Surgical History:   Procedure Laterality Date    BACK SURGERY      CARDIAC CATHETERIZATION N/A 2020    Procedure: Left Heart Cath;  Surgeon: Saman Dyer MD;  Location: [facility] CATH INVASIVE LOCATION;  Service: Cardiovascular;  Laterality: N/A;    CARDIAC CATHETERIZATION N/A 2020    Procedure: Left ventriculography;  Surgeon: Saman Dyer MD;  Location: [facility] CATH INVASIVE LOCATION;  Service: Cardiovascular;  Laterality: N/A;    CARDIAC CATHETERIZATION N/A 2020    Procedure: Stent VIC coronary;  Surgeon: Saman Dyer MD;  Location: [facility] CATH INVASIVE LOCATION;  Service: Cardiovascular;  Laterality: N/A;  rca/lad    CARDIAC CATHETERIZATION N/A 2020    Procedure: Coronary angiography;  Surgeon: Saman Dyer MD;  Location: [facility] CATH INVASIVE LOCATION;  Service: Cardiovascular;  Laterality: N/A;    CARDIAC CATHETERIZATION N/A 2020    Procedure: Percutaneous Coronary Intervention;  Surgeon: Saman Dyer MD;  Location: [facility] CATH INVASIVE LOCATION;  Service: Cardiovascular;  Laterality: N/A;    CARDIAC SURGERY      EYE SURGERY      HYSTERECTOMY      NECK SURGERY      SHOULDER SURGERY Left         Procedures       Objective:         PHYSICAL EXAM:  GEN: VSS, no distress,   Eyes: normal sclera, normal lids and lashes  HENT: moist mucus membranes,   Respiratory: CTAB, no rales or wheezes  CV: RRR, no murmurs, , +2 DP and 2+ carotid pulses b/l  GI: NABS, soft,  Nontender, nondistended  MSK: no edema, no scoliosis or kyphosis  Skin: no rash, warm, dry  Heme/Lymph: no bruising or bleeding  Psych: organized thought, normal behavior and affect  Neuro: Cranial nerves grossly intact, Alert and Oriented x 3.         Assessment:          Diagnosis Plan   1. Coronary artery disease of native artery of native heart with stable angina pectoris  Case Request Cath Lab: Coronary angiography, Left heart catheterization, Left ventricular angiography             Plan:       1. CAD: S/p LAD stenting in 2002 and repeat PCI in 2020 to the ostial LAD, mid-distal LAD (3.5x38 and 3.5 x15), and mid RCA (4.0x38). ASA.  She has new exertional burning over the last 6 weeks or so.  It has improved with Imdur but though not totally resolved.  She has an apical infarct which is new on nuclear stress last month.  Plan cardiac catheterization  2. Ischemic cardiomyopathy, EF 35 to 40%.  Beta-blockade and ACE.  No evidence of volume overload today.    Dr. Mccain, thank you very much for referring this kind patient to me. Please call me with any questions or concerns. I will see the patient again in the office in 6 months.         Ana Mack MD  10/16/23  [facility]    Outpatient Encounter Medications as of 10/16/2023   Medication Sig Dispense Refill    ALPRAZolam (XANAX) 0.5 MG tablet Take 0.5 tablets by mouth 2 (Two) Times a Day As Needed for Anxiety.      aspirin 81 MG chewable tablet Chew 1 tablet Daily. 30 tablet 11    baclofen (LIORESAL) 20 MG tablet Take 1 tablet by mouth 4 (Four) Times a Day.      escitalopram (LEXAPRO) 10 MG tablet Take  by mouth every night at bedtime.  10    FREESTYLE LITE test strip TEST AS  DIRECTED EVERY MORNING      gabapentin (NEURONTIN) 600 MG tablet Take 1 tablet by mouth 2 (Two) Times a Day.      glipizide (GLUCOTROL) 10 MG tablet Take 1 tablet by mouth Daily.      isosorbide mononitrate (IMDUR) 60 MG 24 hr tablet Take 1 tablet by mouth Every Morning. 30 tablet 11    lisinopril (PRINIVIL,ZESTRIL) 20 MG tablet TAKE 1 TABLET BY MOUTH TWICE DAILY 180 tablet 1    metFORMIN (GLUCOPHAGE) 1000 MG tablet Take 1 tablet by mouth Daily.      metoprolol succinate XL (TOPROL-XL) 100 MG 24 hr tablet Take 0.5 tablets by mouth Daily.      montelukast (SINGULAIR) 10 MG tablet Take 1 tablet by mouth Daily.      oxyCODONE-acetaminophen (PERCOCET)  MG per tablet Take 1 tablet by mouth Every 12 (Twelve) Hours As Needed for Moderate Pain .      pantoprazole (PROTONIX) 40 MG EC tablet Take 1 tablet by mouth 2 (Two) Times a Day.      promethazine (PHENERGAN) 25 MG tablet Take 1 tablet by mouth Every 6 (Six) Hours As Needed for Nausea or Vomiting.      rosuvastatin (CRESTOR) 20 MG tablet Take 1 tablet by mouth Daily.      SEMAGLUTIDE,0.25 OR 0.5MG/DOS, SC Inject  under the skin into the appropriate area as directed.      spironolactone (ALDACTONE) 25 MG tablet TAKE 1 TABLET BY MOUTH DAILY 90 tablet 3    Lancets (freestyle) lancets TEST AS DIRECTED EVERY MORNING      nicotine (NICODERM CQ) 14 MG/24HR patch Place 1 patch on the skin as directed by provider Daily. (Patient not taking: Reported on 10/16/2023) 7 each 0    nicotine (NICODERM CQ) 21 MG/24HR patch Place 1 patch on the skin as directed by provider Daily. (Patient not taking: Reported on 10/16/2023) 7 each 0    nicotine (NICODERM CQ) 7 MG/24HR patch Place 1 patch on the skin as directed by provider Daily. (Patient not taking: Reported on 10/16/2023) 7 each 0    nitroglycerin (NITROSTAT) 0.4 MG SL tablet 1 under the tongue as needed for angina, may repeat q5mins for up three doses. Call 911 if no relief in chest pain after 1st dose. (Patient not taking:  Reported on 10/16/2023) 25 tablet 11     No facility-administered encounter medications on file as of 10/16/2023.

## 2023-10-16 NOTE — H&P (VIEW-ONLY)
Subjective:     Encounter Date:10/16/2023      Patient ID: Grace Mcclain is a 63 y.o. female.    Chief Complaint:  HPI:   This is a 63-year-old woman with CAD and ischemic cardiomyopathy. She suffered acute anterior MI in 2002.  She was seen and treated by Dr. Wren at [facility] and received a LAD stent at that time.  When I saw her in early 2020 she described recurrent angina, and had an abnormal stress test. LHC showed severe LAD disease and she received 2 VIC from ostial to proximal and mid to distal LAD, as well as the mid RCA. At that time she was found to have an ischemic CM with EF of 35-40%.   She was seen by our nurse practitioner Amber last month.  At that time she was having new onset chest and back burning which is similar to her prior angina.  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  Show nuclear stress test showing a new apical infarct.  She was started on Imdur.  She returns today.  She states that her symptoms have improved though not totally resolved on Imdur 60.  It does not give her a headache  The following portions of the patient's history were reviewed and updated as appropriate: allergies, current medications, past family history, past medical history, past social history, past surgical history and problem list.     REVIEW OF SYSTEMS:   All systems reviewed.  Pertinent positives identified in HPI.  All other systems are negative.    Past Medical History:   Diagnosis Date    CAD (coronary artery disease)     Diabetes mellitus     Elevated cholesterol     Essential hypertension     Ischemic cardiomyopathy     Mixed hyperlipidemia     Myocardial infarction     LAYTON on auto CPAP 07/20/2020    Home sleep study.  Moderate LAYTON with AHI 15.2 events per hour.  No sleep-related hypoxia.  The patient snored 6% of the total monitoring time.    PE (pulmonary thromboembolism)        Family History   Problem Relation Age of  Onset    Other Mother          in motor vehicle accident .    Other Father          in motor vehicle accident in .       Social History     Socioeconomic History    Marital status:      Spouse name: Jose Mcclain    Number of children: 2    Years of education: 12    Highest education level: High school graduate   Tobacco Use    Smoking status: Every Day     Packs/day: 0.50     Years: 36.00     Additional pack years: 0.00     Total pack years: 18.00     Types: Cigarettes     Start date:     Smokeless tobacco: Never    Tobacco comments:     daily caffeine use   Vaping Use    Vaping Use: Never used   Substance and Sexual Activity    Alcohol use: No    Drug use: No    Sexual activity: Defer       No Known Allergies    Past Surgical History:   Procedure Laterality Date    BACK SURGERY      CARDIAC CATHETERIZATION N/A 2020    Procedure: Left Heart Cath;  Surgeon: Saman Dyer MD;  Location: [facility] CATH INVASIVE LOCATION;  Service: Cardiovascular;  Laterality: N/A;    CARDIAC CATHETERIZATION N/A 2020    Procedure: Left ventriculography;  Surgeon: Saman Dyer MD;  Location: [facility] CATH INVASIVE LOCATION;  Service: Cardiovascular;  Laterality: N/A;    CARDIAC CATHETERIZATION N/A 2020    Procedure: Stent VIC coronary;  Surgeon: Saman Dyer MD;  Location: [facility] CATH INVASIVE LOCATION;  Service: Cardiovascular;  Laterality: N/A;  rca/lad    CARDIAC CATHETERIZATION N/A 2020    Procedure: Coronary angiography;  Surgeon: Saman Dyer MD;  Location: [facility] CATH INVASIVE LOCATION;  Service: Cardiovascular;  Laterality: N/A;    CARDIAC CATHETERIZATION N/A 2020    Procedure: Percutaneous Coronary Intervention;  Surgeon: Saman Dyer MD;  Location: [facility] CATH INVASIVE LOCATION;  Service: Cardiovascular;  Laterality: N/A;    CARDIAC SURGERY      EYE SURGERY      HYSTERECTOMY      NECK SURGERY      SHOULDER SURGERY Left         Procedures       Objective:         PHYSICAL EXAM:  GEN: VSS, no distress,   Eyes: normal sclera, normal lids and lashes  HENT: moist mucus membranes,   Respiratory: CTAB, no rales or wheezes  CV: RRR, no murmurs, , +2 DP and 2+ carotid pulses b/l  GI: NABS, soft,  Nontender, nondistended  MSK: no edema, no scoliosis or kyphosis  Skin: no rash, warm, dry  Heme/Lymph: no bruising or bleeding  Psych: organized thought, normal behavior and affect  Neuro: Cranial nerves grossly intact, Alert and Oriented x 3.         Assessment:          Diagnosis Plan   1. Coronary artery disease of native artery of native heart with stable angina pectoris  Case Request Cath Lab: Coronary angiography, Left heart catheterization, Left ventricular angiography             Plan:       1. CAD: S/p LAD stenting in 2002 and repeat PCI in 2020 to the ostial LAD, mid-distal LAD (3.5x38 and 3.5 x15), and mid RCA (4.0x38). ASA.  She has new exertional burning over the last 6 weeks or so.  It has improved with Imdur but though not totally resolved.  She has an apical infarct which is new on nuclear stress last month.  Plan cardiac catheterization  2. Ischemic cardiomyopathy, EF 35 to 40%.  Beta-blockade and ACE.  No evidence of volume overload today.    Dr. Mccain, thank you very much for referring this kind patient to me. Please call me with any questions or concerns. I will see the patient again in the office in 6 months.         Ana Mack MD  10/16/23  [facility]    Outpatient Encounter Medications as of 10/16/2023   Medication Sig Dispense Refill    ALPRAZolam (XANAX) 0.5 MG tablet Take 0.5 tablets by mouth 2 (Two) Times a Day As Needed for Anxiety.      aspirin 81 MG chewable tablet Chew 1 tablet Daily. 30 tablet 11    baclofen (LIORESAL) 20 MG tablet Take 1 tablet by mouth 4 (Four) Times a Day.      escitalopram (LEXAPRO) 10 MG tablet Take  by mouth every night at bedtime.  10    FREESTYLE LITE test strip TEST AS  DIRECTED EVERY MORNING      gabapentin (NEURONTIN) 600 MG tablet Take 1 tablet by mouth 2 (Two) Times a Day.      glipizide (GLUCOTROL) 10 MG tablet Take 1 tablet by mouth Daily.      isosorbide mononitrate (IMDUR) 60 MG 24 hr tablet Take 1 tablet by mouth Every Morning. 30 tablet 11    lisinopril (PRINIVIL,ZESTRIL) 20 MG tablet TAKE 1 TABLET BY MOUTH TWICE DAILY 180 tablet 1    metFORMIN (GLUCOPHAGE) 1000 MG tablet Take 1 tablet by mouth Daily.      metoprolol succinate XL (TOPROL-XL) 100 MG 24 hr tablet Take 0.5 tablets by mouth Daily.      montelukast (SINGULAIR) 10 MG tablet Take 1 tablet by mouth Daily.      oxyCODONE-acetaminophen (PERCOCET)  MG per tablet Take 1 tablet by mouth Every 12 (Twelve) Hours As Needed for Moderate Pain .      pantoprazole (PROTONIX) 40 MG EC tablet Take 1 tablet by mouth 2 (Two) Times a Day.      promethazine (PHENERGAN) 25 MG tablet Take 1 tablet by mouth Every 6 (Six) Hours As Needed for Nausea or Vomiting.      rosuvastatin (CRESTOR) 20 MG tablet Take 1 tablet by mouth Daily.      SEMAGLUTIDE,0.25 OR 0.5MG/DOS, SC Inject  under the skin into the appropriate area as directed.      spironolactone (ALDACTONE) 25 MG tablet TAKE 1 TABLET BY MOUTH DAILY 90 tablet 3    Lancets (freestyle) lancets TEST AS DIRECTED EVERY MORNING      nicotine (NICODERM CQ) 14 MG/24HR patch Place 1 patch on the skin as directed by provider Daily. (Patient not taking: Reported on 10/16/2023) 7 each 0    nicotine (NICODERM CQ) 21 MG/24HR patch Place 1 patch on the skin as directed by provider Daily. (Patient not taking: Reported on 10/16/2023) 7 each 0    nicotine (NICODERM CQ) 7 MG/24HR patch Place 1 patch on the skin as directed by provider Daily. (Patient not taking: Reported on 10/16/2023) 7 each 0    nitroglycerin (NITROSTAT) 0.4 MG SL tablet 1 under the tongue as needed for angina, may repeat q5mins for up three doses. Call 911 if no relief in chest pain after 1st dose. (Patient not taking:  Reported on 10/16/2023) 25 tablet 11     No facility-administered encounter medications on file as of 10/16/2023.

## 2023-10-18 ENCOUNTER — TRANSCRIBE ORDERS (OUTPATIENT)
Dept: CARDIOLOGY | Facility: CLINIC | Age: 63
End: 2023-10-18
Payer: MEDICARE

## 2023-10-18 DIAGNOSIS — Z01.810 PRE-OPERATIVE CARDIOVASCULAR EXAMINATION: ICD-10-CM

## 2023-10-18 DIAGNOSIS — Z13.6 SCREENING FOR ISCHEMIC HEART DISEASE: Primary | ICD-10-CM

## 2023-10-18 PROBLEM — I25.118 CORONARY ARTERY DISEASE OF NATIVE ARTERY OF NATIVE HEART WITH STABLE ANGINA PECTORIS: Status: ACTIVE | Noted: 2023-10-16

## 2023-10-25 ENCOUNTER — HOSPITAL ENCOUNTER (OUTPATIENT)
Facility: HOSPITAL | Age: 63
Setting detail: HOSPITAL OUTPATIENT SURGERY
Discharge: HOME | End: 2023-10-25
Attending: INTERNAL MEDICINE | Admitting: INTERNAL MEDICINE
Payer: MEDICARE

## 2023-10-25 VITALS
TEMPERATURE: 98.6 F | HEART RATE: 69 BPM | SYSTOLIC BLOOD PRESSURE: 120 MMHG | HEIGHT: 65 IN | WEIGHT: 164 LBS | OXYGEN SATURATION: 93 % | RESPIRATION RATE: 18 BRPM | DIASTOLIC BLOOD PRESSURE: 66 MMHG | BODY MASS INDEX: 27.32 KG/M2

## 2023-10-25 DIAGNOSIS — I25.118 CORONARY ARTERY DISEASE OF NATIVE ARTERY OF NATIVE HEART WITH STABLE ANGINA PECTORIS: ICD-10-CM

## 2023-10-25 LAB — GLUCOSE BLDC GLUCOMTR-MCNC: 170 MG/DL (ref 70–130)

## 2023-10-25 PROCEDURE — 25810000003 SODIUM CHLORIDE 0.9 % SOLUTION: Performed by: INTERNAL MEDICINE

## 2023-10-25 PROCEDURE — C1753 CATH, INTRAVAS ULTRASOUND: HCPCS | Performed by: INTERNAL MEDICINE

## 2023-10-25 PROCEDURE — 82948 REAGENT STRIP/BLOOD GLUCOSE: CPT

## 2023-10-25 PROCEDURE — C1769 GUIDE WIRE: HCPCS | Performed by: INTERNAL MEDICINE

## 2023-10-25 PROCEDURE — 93458 L HRT ARTERY/VENTRICLE ANGIO: CPT | Performed by: INTERNAL MEDICINE

## 2023-10-25 PROCEDURE — 93799 UNLISTED CV SVC/PROCEDURE: CPT | Performed by: INTERNAL MEDICINE

## 2023-10-25 PROCEDURE — 25010000002 FENTANYL CITRATE (PF) 50 MCG/ML SOLUTION: Performed by: INTERNAL MEDICINE

## 2023-10-25 PROCEDURE — 92978 ENDOLUMINL IVUS OCT C 1ST: CPT | Performed by: INTERNAL MEDICINE

## 2023-10-25 PROCEDURE — C1887 CATHETER, GUIDING: HCPCS | Performed by: INTERNAL MEDICINE

## 2023-10-25 PROCEDURE — 93571 IV DOP VEL&/PRESS C FLO 1ST: CPT | Performed by: INTERNAL MEDICINE

## 2023-10-25 PROCEDURE — C1894 INTRO/SHEATH, NON-LASER: HCPCS | Performed by: INTERNAL MEDICINE

## 2023-10-25 PROCEDURE — 25010000002 MIDAZOLAM PER 1 MG: Performed by: INTERNAL MEDICINE

## 2023-10-25 PROCEDURE — 85347 COAGULATION TIME ACTIVATED: CPT

## 2023-10-25 PROCEDURE — 25510000001 IOPAMIDOL PER 1 ML: Performed by: INTERNAL MEDICINE

## 2023-10-25 PROCEDURE — 25010000002 HEPARIN (PORCINE) PER 1000 UNITS: Performed by: INTERNAL MEDICINE

## 2023-10-25 RX ORDER — VERAPAMIL HYDROCHLORIDE 2.5 MG/ML
INJECTION, SOLUTION INTRAVENOUS
Status: DISCONTINUED | OUTPATIENT
Start: 2023-10-25 | End: 2023-10-25 | Stop reason: HOSPADM

## 2023-10-25 RX ORDER — NITROGLYCERIN 0.4 MG/1
0.4 TABLET SUBLINGUAL
Status: DISCONTINUED | OUTPATIENT
Start: 2023-10-25 | End: 2023-10-25 | Stop reason: HOSPADM

## 2023-10-25 RX ORDER — ERGOCALCIFEROL (VITAMIN D2) 10 MCG
400 TABLET ORAL DAILY
COMMUNITY

## 2023-10-25 RX ORDER — HEPARIN SODIUM 1000 [USP'U]/ML
INJECTION, SOLUTION INTRAVENOUS; SUBCUTANEOUS
Status: DISCONTINUED | OUTPATIENT
Start: 2023-10-25 | End: 2023-10-25 | Stop reason: HOSPADM

## 2023-10-25 RX ORDER — FENTANYL CITRATE 50 UG/ML
INJECTION, SOLUTION INTRAMUSCULAR; INTRAVENOUS
Status: DISCONTINUED | OUTPATIENT
Start: 2023-10-25 | End: 2023-10-25 | Stop reason: HOSPADM

## 2023-10-25 RX ORDER — LIDOCAINE HYDROCHLORIDE 20 MG/ML
INJECTION, SOLUTION INFILTRATION; PERINEURAL
Status: DISCONTINUED | OUTPATIENT
Start: 2023-10-25 | End: 2023-10-25 | Stop reason: HOSPADM

## 2023-10-25 RX ORDER — MIDAZOLAM HYDROCHLORIDE 1 MG/ML
INJECTION INTRAMUSCULAR; INTRAVENOUS
Status: DISCONTINUED | OUTPATIENT
Start: 2023-10-25 | End: 2023-10-25 | Stop reason: HOSPADM

## 2023-10-25 RX ORDER — SODIUM CHLORIDE 9 MG/ML
75 INJECTION, SOLUTION INTRAVENOUS CONTINUOUS
Status: DISCONTINUED | OUTPATIENT
Start: 2023-10-26 | End: 2023-10-25 | Stop reason: HOSPADM

## 2023-10-25 RX ORDER — ACETAMINOPHEN 325 MG/1
650 TABLET ORAL EVERY 4 HOURS PRN
Status: DISCONTINUED | OUTPATIENT
Start: 2023-10-25 | End: 2023-10-25 | Stop reason: HOSPADM

## 2023-10-25 RX ORDER — ISOSORBIDE MONONITRATE 60 MG/1
120 TABLET, EXTENDED RELEASE ORAL EVERY MORNING
Qty: 30 TABLET | Refills: 11 | Status: SHIPPED | OUTPATIENT
Start: 2023-10-25

## 2023-10-25 RX ORDER — OXYCODONE AND ACETAMINOPHEN 10; 325 MG/1; MG/1
1 TABLET ORAL ONCE
Status: COMPLETED | OUTPATIENT
Start: 2023-10-25 | End: 2023-10-25

## 2023-10-25 RX ORDER — BACLOFEN 10 MG/1
20 TABLET ORAL ONCE
Status: COMPLETED | OUTPATIENT
Start: 2023-10-25 | End: 2023-10-25

## 2023-10-25 RX ADMIN — OXYCODONE AND ACETAMINOPHEN 1 TABLET: 325; 10 TABLET ORAL at 12:54

## 2023-10-25 RX ADMIN — BACLOFEN 20 MG: 10 TABLET ORAL at 12:54

## 2023-10-25 RX ADMIN — SODIUM CHLORIDE 75 ML/HR: 9 INJECTION, SOLUTION INTRAVENOUS at 09:35

## 2023-10-25 NOTE — Clinical Note
catheter advanced into LV.

## 2023-10-25 NOTE — DISCHARGE INSTRUCTIONS
[facility]  [address] [place] [address]    Coronary Angiogram (Radial/Ulnar Approach) After Care    Refer to this sheet in the next few weeks. These instructions provide you with information on caring for yourself after your procedure. Your caregiver may also give you more specific instructions. Your treatment has been planned according to current medical practices, but problems sometimes occur. Call your caregiver if you have any problems or questions after your procedure.    Home Care Instructions:  You may shower the day after the procedure. Remove the bandage (dressing) and gently wash the site with plain soap and water. Gently pat the site dry. You may apply a band aid daily for 2 days if desired.    Do not apply powder or lotion to the site.  Do not submerge the affected site in water for 3 to 5 days or until the site is completely healed.   Do not lift, push or pull anything over 5 pounds for 5 days after your procedure or as directed by your physician.  As a reference, a gallon of milk weighs 8 pounds.   Inspect the site at least twice daily. You may notice some bruising at the site and it may be tender for 1 to 2 weeks.     Increase your fluid intake for the next 2 days.    Keep arm elevated for 24 hours. For the remainder of the day, keep your arm in “Pledge of Allegiance” position when up and about.     You may drive 24 hours after the procedure unless otherwise instructed by your caregiver.  Do not operate machinery or power tools for 24 hours.  A responsible adult should be with you for the first 24 hours after you arrive home. Do not make any important legal decisions or sign legal papers for 24 hours.  Do not drink alcohol for 24 hours.    Metformin or any medications containing Metformin should not be taken for 48 hours after your procedure.      Call Your Doctor if:   You have unusual pain at the radial/ulnar (wrist) site.  You have redness, warmth, swelling, or pain at the  radial/ulnar (wrist) site.  You have drainage (other than a small amount of blood on the dressing).  `You have chills or a fever > 101.  Your arm becomes pale or dark, cool, tingly, or numb.  You develop chest pain, shortness of breath, feel faint or pass out.    You have heavy bleeding from the site, hold pressure on the site for 20 minutes.  If the bleeding stops, apply a fresh bandage and call your cardiologist.  However, if you        continue to have bleeding, call 911 and continue to apply pressure to the site.   You have any symptoms of a stroke.  Remember BE FAST  B-balance. Sudden trouble walking or loss of balance.  E-eyes.  Sudden changes in how you see or a sudden onset of a very bad headache.   F-face. Sudden weakness or loss of feeling of the face or facial droop on one side.   A-arms Sudden weakness or numbness in one arm.  One arm drifts down if they are both held out in front of you. This happens suddenly and usually on one side of the body.   S-speech.  Sudden trouble speaking, slurred speech or trouble understanding what are saying.   T-time  Time to call emergency services.  Write down the symptoms and the time they started.

## 2023-10-27 LAB
ACT BLD: 209 SECONDS (ref 82–152)
ACT BLD: 227 SECONDS (ref 82–152)

## 2024-01-30 RX ORDER — LISINOPRIL 20 MG/1
TABLET ORAL
Qty: 180 TABLET | Refills: 1 | Status: SHIPPED | OUTPATIENT
Start: 2024-01-30

## 2024-09-18 RX ORDER — ISOSORBIDE MONONITRATE 60 MG/1
60 TABLET, EXTENDED RELEASE ORAL EVERY MORNING
Qty: 30 TABLET | Refills: 11 | Status: SHIPPED | OUTPATIENT
Start: 2024-09-18

## 2024-09-20 ENCOUNTER — TELEPHONE (OUTPATIENT)
Dept: CARDIOLOGY | Facility: CLINIC | Age: 64
End: 2024-09-20
Payer: MEDICARE

## 2024-09-20 NOTE — TELEPHONE ENCOUNTER
Pt was last seen by Dr Mack on 10/16/23    Pt is overdue for a follow up   Can you please call pt to get her scheduled

## 2024-09-27 ENCOUNTER — OFFICE VISIT (OUTPATIENT)
Age: 64
End: 2024-09-27
Payer: MEDICARE

## 2024-09-27 VITALS
HEIGHT: 65 IN | DIASTOLIC BLOOD PRESSURE: 56 MMHG | HEART RATE: 77 BPM | WEIGHT: 161.4 LBS | BODY MASS INDEX: 26.89 KG/M2 | SYSTOLIC BLOOD PRESSURE: 110 MMHG

## 2024-09-27 DIAGNOSIS — I25.10 CORONARY ARTERY DISEASE INVOLVING NATIVE CORONARY ARTERY OF NATIVE HEART WITHOUT ANGINA PECTORIS: Primary | ICD-10-CM

## 2024-09-27 PROCEDURE — 3078F DIAST BP <80 MM HG: CPT | Performed by: INTERNAL MEDICINE

## 2024-09-27 PROCEDURE — 3074F SYST BP LT 130 MM HG: CPT | Performed by: INTERNAL MEDICINE

## 2024-09-27 PROCEDURE — 93000 ELECTROCARDIOGRAM COMPLETE: CPT | Performed by: INTERNAL MEDICINE

## 2024-09-27 PROCEDURE — 99214 OFFICE O/P EST MOD 30 MIN: CPT | Performed by: INTERNAL MEDICINE

## 2024-09-27 RX ORDER — PREGABALIN 75 MG/1
1 CAPSULE ORAL 3 TIMES DAILY
COMMUNITY
Start: 2024-09-05

## 2024-09-30 NOTE — TELEPHONE ENCOUNTER
I faxed cardiac clearance to [facility]. Fax [phone]    Jg [facility]  9/30/24

## 2024-10-28 RX ORDER — LISINOPRIL 20 MG/1
TABLET ORAL
Qty: 180 TABLET | Refills: 1 | Status: SHIPPED | OUTPATIENT
Start: 2024-10-28

## 2025-03-27 ENCOUNTER — OFFICE VISIT (OUTPATIENT)
Dept: CARDIOLOGY | Age: 65
End: 2025-03-27
Payer: MEDICARE

## 2025-03-27 VITALS
BODY MASS INDEX: 0.66 KG/M2 | WEIGHT: 4 LBS | DIASTOLIC BLOOD PRESSURE: 84 MMHG | HEART RATE: 86 BPM | SYSTOLIC BLOOD PRESSURE: 120 MMHG | HEIGHT: 65 IN

## 2025-03-27 DIAGNOSIS — R00.2 PALPITATIONS: Primary | ICD-10-CM

## 2025-03-27 RX ORDER — RANOLAZINE 500 MG/1
500 TABLET, EXTENDED RELEASE ORAL 2 TIMES DAILY
Qty: 60 TABLET | Refills: 11 | Status: SHIPPED | OUTPATIENT
Start: 2025-03-27

## 2025-03-27 NOTE — PROGRESS NOTES
Date of Office Visit: 2025  Encounter Provider: CHRISTI Vargas  Place of Service: [facility] CARDIOLOGY  Patient Name: Grace Mcclain  :1960    Chief Complaint   Patient presents with    Coronary Artery Disease     6 month follow up    :     HPI: Grace Mcclain is a 64 y.o. female who is a patient of Dr. Fowler.  She has a history of coronary disease and ischemic cardiomyopathy.  She is new to me today.  She suffered an acute anterior wall MI in .  She was seen and treated by Dr. Wren at [facility] and received an LAD stent at that time.  We saw her in  she was describing recurrent angina.  She had a stress test that was abnormal and underwent left heart cath.  Left heart cath showed severe LAD disease she received 2 drug-eluting stents from the ostial to proximal and mid to distal LAD as well as the mid RCA.  EF was 35 to 40%.    Yesterday in  she had angina again stress test showed an apical infarct but no ischemia.  However due to history we went ahead and did a cardiac cath she had some mild in-stent restenosis of the LAD stents and was treated medically.    In  of last year she developed radicular pain mostly in the right leg she was evaluated by spine surgery and will need some fairly invasive surgery.  She has chronic angina she apparently stopped her isosorbide because she has been getting headaches in was recently diagnosed with migraines and fibromyalgia.  We saw her for a preoperative evaluation in September for spine surgery.  We would like her to not stop her aspirin for surgery if though she is required to then she would have just restarted as soon as possible and she was cleared for operation.  She is here for follow-up today.    She underwent 2 back surgeries last year.  She has been doing well.  She has been a slow recovery for her.  She started getting migraines and they recommended she stop her isosorbide.  She has been having  some discomfort in the chest she describes it as intermittent sharp pains which are brief.  Sometimes it is a sharp jabbing into the left breast.  It is not brought on by exertion can last a few seconds to minutes.  She has no shortness of breath no pressure or tightness with exertion.  Previous testing and notes have been reviewed by me.   Past Medical History:   Diagnosis Date    CAD (coronary artery disease)     Diabetes mellitus     Elevated cholesterol     Essential hypertension     Ischemic cardiomyopathy     Mixed hyperlipidemia     Myocardial infarction     LAYTON on auto CPAP 07/20/2020    Home sleep study.  Moderate LAYTON with AHI 15.2 events per hour.  No sleep-related hypoxia.  The patient snored 6% of the total monitoring time.    PE (pulmonary thromboembolism)        Past Surgical History:   Procedure Laterality Date    BACK SURGERY      CARDIAC CATHETERIZATION N/A 02/28/2020    Procedure: Left Heart Cath;  Surgeon: Saman Dyer MD;  Location: [facility] CATH INVASIVE LOCATION;  Service: Cardiovascular;  Laterality: N/A;    CARDIAC CATHETERIZATION N/A 02/28/2020    Procedure: Left ventriculography;  Surgeon: Saman Dyer MD;  Location: [facility] CATH INVASIVE LOCATION;  Service: Cardiovascular;  Laterality: N/A;    CARDIAC CATHETERIZATION N/A 02/28/2020    Procedure: Stent VIC coronary;  Surgeon: Saman Dyer MD;  Location: [facility] CATH INVASIVE LOCATION;  Service: Cardiovascular;  Laterality: N/A;  rca/lad    CARDIAC CATHETERIZATION N/A 02/28/2020    Procedure: Coronary angiography;  Surgeon: Saman Dyer MD;  Location: [facility] CATH INVASIVE LOCATION;  Service: Cardiovascular;  Laterality: N/A;    CARDIAC CATHETERIZATION N/A 02/28/2020    Procedure: Percutaneous Coronary Intervention;  Surgeon: Saman Dyer MD;  Location: [facility] CATH INVASIVE LOCATION;  Service: Cardiovascular;  Laterality: N/A;    CARDIAC CATHETERIZATION N/A 10/25/2023    Procedure: Coronary  angiography, Left heart catheterization, Left ventricular angiography;  Surgeon: Ana Mack MD;  Location: [facility] CATH INVASIVE LOCATION;  Service: Cardiovascular;  Laterality: N/A;    CARDIAC CATHETERIZATION N/A 10/25/2023    Procedure: Resting Full Cycle Ratio;  Surgeon: Ana Mack MD;  Location:  [facility] CATH INVASIVE LOCATION;  Service: Cardiovascular;  Laterality: N/A;    CARDIAC CATHETERIZATION N/A 10/25/2023    Procedure: Optical Coherence Tomography;  Surgeon: Ana Mack MD;  Location: [facility] CATH INVASIVE LOCATION;  Service: Cardiovascular;  Laterality: N/A;    CARDIAC SURGERY      EYE SURGERY      HYSTERECTOMY      NECK SURGERY      X2    SHOULDER SURGERY Left        Social History     Socioeconomic History    Marital status:      Spouse name: Jose Mcclain    Number of children: 2    Years of education: 12    Highest education level: High school graduate   Tobacco Use    Smoking status: Every Day     Current packs/day: 0.50     Average packs/day: 0.5 packs/day for 46.2 years (23.1 ttl pk-yrs)     Types: Cigarettes     Start date:     Smokeless tobacco: Never    Tobacco comments:     daily caffeine use   Vaping Use    Vaping status: Never Used   Substance and Sexual Activity    Alcohol use: No    Drug use: No    Sexual activity: Defer       Family History   Problem Relation Age of Onset    Other Mother          in motor vehicle accident .    Other Father          in motor vehicle accident in .       Review of Systems   Constitutional: Positive for malaise/fatigue. Negative for diaphoresis.   Cardiovascular:  Positive for chest pain. Negative for claudication, dyspnea on exertion, irregular heartbeat, leg swelling, near-syncope, orthopnea, palpitations, paroxysmal nocturnal dyspnea and syncope.   Respiratory:  Negative for cough, shortness of breath and sleep disturbances due to breathing.    Musculoskeletal:  Positive for myalgias. Negative for falls.   Neurological:   Negative for dizziness and weakness.   Psychiatric/Behavioral:  Negative for altered mental status and substance abuse.        No Known Allergies      Current Outpatient Medications:     ALPRAZolam (XANAX) 0.5 MG tablet, Take 0.5 tablets by mouth 2 (Two) Times a Day As Needed for Anxiety., Disp: , Rfl:     aspirin 81 MG chewable tablet, Chew 1 tablet Daily., Disp: 30 tablet, Rfl: 11    baclofen (LIORESAL) 20 MG tablet, Take 1 tablet by mouth 4 (Four) Times a Day., Disp: , Rfl:     escitalopram (LEXAPRO) 10 MG tablet, Take  by mouth every night at bedtime., Disp: , Rfl: 10    FREESTYLE LITE test strip, TEST AS DIRECTED EVERY MORNING, Disp: , Rfl:     glipizide (GLUCOTROL) 10 MG tablet, Take 1 tablet by mouth Daily., Disp: , Rfl:     Lancets (freestyle) lancets, TEST AS DIRECTED EVERY MORNING, Disp: , Rfl:     lisinopril (PRINIVIL,ZESTRIL) 20 MG tablet, TAKE 1 TABLET BY MOUTH TWICE DAILY, Disp: 180 tablet, Rfl: 1    metFORMIN (GLUCOPHAGE) 1000 MG tablet, Take 1 tablet by mouth Daily., Disp: , Rfl:     metoprolol succinate XL (TOPROL-XL) 100 MG 24 hr tablet, Take 0.5 tablets by mouth Daily., Disp: , Rfl:     montelukast (SINGULAIR) 10 MG tablet, Take 1 tablet by mouth Daily., Disp: , Rfl:     nitroglycerin (NITROSTAT) 0.4 MG SL tablet, 1 under the tongue as needed for angina, may repeat q5mins for up three doses. Call 911 if no relief in chest pain after 1st dose., Disp: 25 tablet, Rfl: 11    oxyCODONE-acetaminophen (PERCOCET)  MG per tablet, Take 1 tablet by mouth Every 12 (Twelve) Hours As Needed for Moderate Pain ., Disp: , Rfl:     pantoprazole (PROTONIX) 40 MG EC tablet, Take 1 tablet by mouth 2 (Two) Times a Day., Disp: , Rfl:     pregabalin (LYRICA) 75 MG capsule, Take 1 capsule by mouth 3 times a day., Disp: , Rfl:     promethazine (PHENERGAN) 25 MG tablet, Take 1 tablet by mouth Every 6 (Six) Hours As Needed for Nausea or Vomiting., Disp: , Rfl:     rosuvastatin (CRESTOR) 20 MG tablet, Take 1 tablet by  "mouth Daily., Disp: , Rfl:     SEMAGLUTIDE,0.25 OR 0.5MG/DOS, SC, Inject  under the skin into the appropriate area as directed., Disp: , Rfl:     Vitamin D, Cholecalciferol, (CHOLECALCIFEROL) 10 MCG (400 UNIT) tablet, Take 1 tablet by mouth Daily., Disp: , Rfl:     ranolazine (Ranexa) 500 MG 12 hr tablet, Take 1 tablet by mouth 2 (Two) Times a Day., Disp: 60 tablet, Rfl: 11        Objective:     Vitals:    03/27/25 1351   BP: 120/84   Pulse: 86   Weight: (!) 1.814 kg (4 lb)   Height: 165.1 cm (65\")     Body mass index is 0.67 kg/m².    PHYSICAL EXAM:    Constitutional:       General: Not in acute distress.     Appearance: Normal appearance. Well-developed.   Eyes:      Pupils: Pupils are equal, round, and reactive to light.   HENT:      Head: Normocephalic.   Neck:      Vascular: No carotid bruit or JVD.   Pulmonary:      Effort: Pulmonary effort is normal. No tachypnea.      Breath sounds: Normal breath sounds. No wheezing. No rales.   Cardiovascular:      Normal rate. Regular rhythm.      No gallop.    Pulses:     Intact distal pulses.   Edema:     Peripheral edema absent.   Abdominal:      General: Bowel sounds are normal.      Palpations: Abdomen is soft.      Tenderness: There is no abdominal tenderness.   Musculoskeletal: Normal range of motion.      Cervical back: Normal range of motion and neck supple. No edema. Skin:     General: Skin is warm and dry.   Neurological:      Mental Status: Alert and oriented to person, place, and time.           ECG 12 Lead    Date/Time: 3/27/2025 4:10 PM  Performed by: Amber Olivas APRN    Authorized by: Amber Olivas APRN  Comparison: compared with previous ECG from 9/27/2024  Similar to previous ECG  Rhythm: sinus rhythm  Rate: normal  Q waves: V2, V1 and V3    QRS axis: normal  Other findings: non-specific ST-T wave changes    Clinical impression: non-specific ECG              Assessment/Plan:      1.  Coronary artery disease history of stenting to the LAD and " RCA.  Continue aspirin 81 mg daily and addition to metoprolol  mg daily.  No angina symptoms.  Having some atypical sharp chest pain history of negative stress test last year.  Patient has stable angina she has not been able to take isosorbide because it is been making her have migraine exacerbations.  Will put on Ranexa 500 mg twice a day    2.  Chronic ischemic cardiomyopathy EF 30 to 35%-continue guideline directed medical therapy with lisinopril, metoprolol XL and spironolactone.    3.  Essential hypertension-blood pressure controlled continue lisinopril 20 mg twice a day, metoprolol XL half tablet daily 50 mg.  Watch salt intake at home    4.  Dyslipidemia lipids at goal continue rosuvastatin 20 mg daily    5.  Lumbar back pain-recently had 2 back surgeries for spinal fusion.     Follow-up in 6 months sooner if needed         Your medication list            Accurate as of March 27, 2025  4:12 PM. If you have any questions, ask your nurse or doctor.                START taking these medications        Instructions Last Dose Given Next Dose Due   ranolazine 500 MG 12 hr tablet  Commonly known as: Ranexa  Started by: Amber Olivas      Take 1 tablet by mouth 2 (Two) Times a Day.              CONTINUE taking these medications        Instructions Last Dose Given Next Dose Due   ALPRAZolam 0.5 MG tablet  Commonly known as: XANAX      Take 0.5 tablets by mouth 2 (Two) Times a Day As Needed for Anxiety.       aspirin 81 MG chewable tablet      Chew 1 tablet Daily.       baclofen 20 MG tablet  Commonly known as: LIORESAL      Take 1 tablet by mouth 4 (Four) Times a Day.       escitalopram 10 MG tablet  Commonly known as: LEXAPRO      Take  by mouth every night at bedtime.       freestyle lancets      TEST AS DIRECTED EVERY MORNING       FREESTYLE LITE test strip  Generic drug: glucose blood      TEST AS DIRECTED EVERY MORNING       glipizide 10 MG tablet  Commonly known as: GLUCOTROL      Take 1 tablet by mouth  Daily.       lisinopril 20 MG tablet  Commonly known as: PRINIVIL,ZESTRIL      TAKE 1 TABLET BY MOUTH TWICE DAILY       metFORMIN 1000 MG tablet  Commonly known as: GLUCOPHAGE      Take 1 tablet by mouth Daily.       metoprolol succinate  MG 24 hr tablet  Commonly known as: TOPROL-XL      Take 0.5 tablets by mouth Daily.       montelukast 10 MG tablet  Commonly known as: SINGULAIR      Take 1 tablet by mouth Daily.       nitroglycerin 0.4 MG SL tablet  Commonly known as: NITROSTAT      1 under the tongue as needed for angina, may repeat q5mins for up three doses. Call 911 if no relief in chest pain after 1st dose.       oxyCODONE-acetaminophen  MG per tablet  Commonly known as: PERCOCET      Take 1 tablet by mouth Every 12 (Twelve) Hours As Needed for Moderate Pain .       pantoprazole 40 MG EC tablet  Commonly known as: PROTONIX      Take 1 tablet by mouth 2 (Two) Times a Day.       pregabalin 75 MG capsule  Commonly known as: LYRICA      Take 1 capsule by mouth 3 times a day.       promethazine 25 MG tablet  Commonly known as: PHENERGAN      Take 1 tablet by mouth Every 6 (Six) Hours As Needed for Nausea or Vomiting.       rosuvastatin 20 MG tablet  Commonly known as: CRESTOR      Take 1 tablet by mouth Daily.       SEMAGLUTIDE(0.25 OR 0.5MG/DOS) SC      Inject  under the skin into the appropriate area as directed.       Vitamin D (Cholecalciferol) 10 MCG (400 UNIT) tablet  Commonly known as: CHOLECALCIFEROL      Take 1 tablet by mouth Daily.              STOP taking these medications      gabapentin 600 MG tablet  Commonly known as: NEURONTIN  Stopped by: Amber Olivas        isosorbide mononitrate 60 MG 24 hr tablet  Commonly known as: IMDUR  Stopped by: Amber Olivas        nicotine 14 MG/24HR patch  Commonly known as: NICODERM CQ  Stopped by: Amber Olivas        nicotine 21 MG/24HR patch  Commonly known as: NICODERM CQ  Stopped by: Amber Olivas        nicotine 7 MG/24HR patch  Commonly known as: NICODERM  CQ  Stopped by: Amber Olivas        spironolactone 25 MG tablet  Commonly known as: ALDACTONE  Stopped by: Amber Olivas                  Where to Get Your Medications        These medications were sent to [id number] - [place], [place] - [address] AT [facility] - [phone] [facility] [phone]   [id number], [facility] [address]      Phone: [phone]   ranolazine 500 MG 12 hr tablet           As always, it has been a pleasure to participate in your patient's care.      Sincerely,     Amber BARRAZA

## 2025-04-21 RX ORDER — LISINOPRIL 20 MG/1
20 TABLET ORAL EVERY 12 HOURS SCHEDULED
Qty: 180 TABLET | Refills: 1 | Status: SHIPPED | OUTPATIENT
Start: 2025-04-21

## 2025-07-31 ENCOUNTER — TELEPHONE (OUTPATIENT)
Dept: CARDIOLOGY | Facility: CLINIC | Age: 65
End: 2025-07-31
Payer: MEDICARE

## 2025-07-31 NOTE — TELEPHONE ENCOUNTER
Patient is going to have an upcoming colonoscopy with the office of Dr. Mac and the office needs to have cardiac sussy letter and needs the patient to come off her blood thinners. Needs to be faxed to [phone]

## (undated) DEVICE — RUNTHROUGH NS EXTRA FLOPPY PTCA GUIDEWIRE: Brand: RUNTHROUGH

## (undated) DEVICE — DEV INDEFLATOR

## (undated) DEVICE — GW PRESSUREWIRE AERIS W/ AGILE TP 175CM

## (undated) DEVICE — GW EMR FIX EXCHG J STD .035 3MM 260CM

## (undated) DEVICE — CATH IMG DRAGONFLY OPTIS 2.7F 135CM

## (undated) DEVICE — NC TREK CORONARY DILATATION CATHETER 3.5 MM X 12 MM / RAPID-EXCHANGE: Brand: NC TREK

## (undated) DEVICE — PK CATH CARD 40

## (undated) DEVICE — NC TREK CORONARY DILATATION CATHETER 4.0 MM X 20 MM / RAPID-EXCHANGE: Brand: NC TREK

## (undated) DEVICE — CATH DIAG IMPULSE FL3.5 5F 100CM

## (undated) DEVICE — CATH DIAG IMPULSE FR5 5F 100CM

## (undated) DEVICE — TREK CORONARY DILATATION CATHETER 3.50 MM X 30 MM / RAPID-EXCHANGE: Brand: TREK

## (undated) DEVICE — 6F .070 JR 4 100CM: Brand: CORDIS

## (undated) DEVICE — GLIDESHEATH SLENDER STAINLESS STEEL KIT: Brand: GLIDESHEATH SLENDER

## (undated) DEVICE — RADIFOCUS OPTITORQUE ANGIOGRAPHIC CATHETER: Brand: OPTITORQUE

## (undated) DEVICE — KT MANIFLD CARDIAC

## (undated) DEVICE — CATH VENT MIV RADL PIG ST TIP 5F 110CM

## (undated) DEVICE — DEV INDEFLATOR P/N 580289

## (undated) DEVICE — BND PRESS RADL COMFRT 14IN STRL

## (undated) DEVICE — 6F .070 XB 3.5 100CM: Brand: VISTA BRITE TIP

## (undated) DEVICE — TR BAND RADIAL ARTERY COMPRESSION DEVICE: Brand: TR BAND

## (undated) DEVICE — NC TREK CORONARY DILATATION CATHETER 3.5 MM X 25 MM / RAPID-EXCHANGE: Brand: NC TREK

## (undated) DEVICE — TREK CORONARY DILATATION CATHETER 3.50 MM X 15 MM / RAPID-EXCHANGE: Brand: TREK

## (undated) DEVICE — 6F .070 H-STK 100CM: Brand: VISTA BRITE TIP